# Patient Record
Sex: MALE | Race: WHITE | NOT HISPANIC OR LATINO | Employment: OTHER | URBAN - METROPOLITAN AREA
[De-identification: names, ages, dates, MRNs, and addresses within clinical notes are randomized per-mention and may not be internally consistent; named-entity substitution may affect disease eponyms.]

---

## 2018-09-24 ENCOUNTER — TELEPHONE (OUTPATIENT)
Dept: ORTHOPEDICS | Facility: CLINIC | Age: 73
End: 2018-09-24

## 2018-09-24 NOTE — TELEPHONE ENCOUNTER
Patient calls stating he has an appointment to see Dr. Marks for a knee injury on 9/28/18.   He would like some blood work done ahead of time if possible not related to his knee injury.   He states to have his minerals, etc tested.   Recommended that he discuss with Dr. Marks at his appointment and explained that if blood work was not related to his knee injury, it would be more appropriate for him to have PCP order labs as out of scope of practice for routine labs not related to condition being seen for.   He states his old PCP is no longer practicing and he does not have a new one yet.   He will follow up at his appointment.     WOLFGANG Lazaro RN

## 2018-09-28 ENCOUNTER — RADIANT APPOINTMENT (OUTPATIENT)
Dept: GENERAL RADIOLOGY | Facility: CLINIC | Age: 73
End: 2018-09-28
Attending: FAMILY MEDICINE
Payer: MEDICARE

## 2018-09-28 ENCOUNTER — OFFICE VISIT (OUTPATIENT)
Dept: ORTHOPEDICS | Facility: CLINIC | Age: 73
End: 2018-09-28
Payer: MEDICARE

## 2018-09-28 VITALS
DIASTOLIC BLOOD PRESSURE: 74 MMHG | HEIGHT: 67 IN | BODY MASS INDEX: 25.27 KG/M2 | SYSTOLIC BLOOD PRESSURE: 128 MMHG | WEIGHT: 161 LBS

## 2018-09-28 DIAGNOSIS — M25.562 ACUTE PAIN OF LEFT KNEE: Primary | ICD-10-CM

## 2018-09-28 DIAGNOSIS — M25.562 ACUTE PAIN OF LEFT KNEE: ICD-10-CM

## 2018-09-28 PROCEDURE — 73562 X-RAY EXAM OF KNEE 3: CPT

## 2018-09-28 PROCEDURE — 99203 OFFICE O/P NEW LOW 30 MIN: CPT | Performed by: FAMILY MEDICINE

## 2018-09-28 NOTE — MR AVS SNAPSHOT
"              After Visit Summary   9/28/2018    Isiah Fuentes    MRN: [id number]           Patient Information     Date Of Birth          1945        Visit Information        Provider Department      9/28/2018 1:00 PM Rolo Marks,  [facility]        Today's Diagnoses     Acute pain of left knee    -  1      Care Instructions    1. Acute pain of left knee      Knee is ligamentously stable and no fluid  Pain is related to the tracking of your kneecap  Would encourage regular exercise and at least 2 days of strength training.  Begin this when the knee feels more stable / better    Follow up as needed.      Official Walk with a Doc Chapter  Join me downstairs in the lobby of the [facility] the 1st Saturday of every month at 1pm for a free health talk. Come, listen and walk at your own pace!              Follow-ups after your visit        Who to contact     If you have questions or need follow up information about today's clinic visit or your schedule please contact [facility] directly at [phone].  Normal or non-critical lab and imaging results will be communicated to you by [email or web address], letter or phone within 4 business days after the clinic has received the results. If you do not hear from us within 7 days, please contact the clinic through [facility] or phone. If you have a critical or abnormal lab result, we will notify you by phone as soon as possible.  Submit refill requests through [employer] or call your pharmacy and they will forward the refill request to us. Please allow 3 business days for your refill to be completed.          Additional Information About Your Visit        [email or web address] Information     [employer] lets you send messages to your doctor, view your test results, renew your prescriptions, schedule appointments and more. To sign up, go to [email or web address] . Click on \"Log in\" on the left side of the screen, which will take you to " "the Welcome page. Then click on \"Sign up Now\" on the right side of the page.     You will be asked to enter the access code listed below, as well as some personal information. Please follow the directions to create your username and password.     Your access code is: [email or web address]  Expires: 2018 12:42 PM     Your access code will  in 90 days. If you need help or a new code, please call your [facility] clinic or [phone].        Care EveryWhere ID     This is your Care EveryWhere ID. This could be used by other organizations to access your [facility] medical records  [id number]        Your Vitals Were     Height BMI (Body Mass Index)                5' 6.5\" (1.689 m) 25.6 kg/m2           Blood Pressure from Last 3 Encounters:   18 128/74    Weight from Last 3 Encounters:   18 161 lb (73 kg)               Primary Care Provider Fax #    Physician No Ref-Primary [phone]       No address on file        Equal Access to Services     [facility] : Hadii ana ku hadasho Soomaali, waaxda luqadaha, qaybta kaalmada adeegyada, kelsi hayes . So [facility] [phone].    ATENCIÓN: Si habla español, tiene a bhatti disposición servicios gratuitos de asistencia lingüística. Llame al [phone].    We comply with applicable federal civil rights laws and [place] laws. We do not discriminate on the basis of race, color, national origin, age, disability, sex, sexual orientation, or gender identity.            Thank you!     Thank you for choosing [facility]  for your care. Our goal is always to provide you with excellent care. Hearing back from our patients is one way we can continue to improve our services. Please take a few minutes to complete the written survey that you may receive in the mail after your visit with us. Thank you!             Your Updated Medication List - Protect others around you: Learn how to safely use, store and throw away your medicines at " www.disposemymeds.org.      Notice  As of 9/28/2018  1:43 PM    You have not been prescribed any medications.

## 2018-09-28 NOTE — PROGRESS NOTES
"ASSESSMENT & PLAN    1. Acute pain of left knee      Knee is ligamentously stable and no fluid  Pain is related to the tracking of your kneecap  Would encourage regular exercise and at least 2 days of strength training.  Begin this when the knee feels more stable / better    Follow up as needed.      -----    SUBJECTIVE  Isiah Fuentes is a/an 73 year old male who is seen as a self referral for evaluation of left knee pain. The patient is seen with their wife.    Onset: 1 month(s) ago. Pain came on after doing jumping jacks but denies any acute pain caused by the activity   Location of Pain: left knee pain  Rating of Pain at worst: 8/10  Rating of Pain Currently: 2/10  Worsened by: getting up after prolonged sitting, deep knee flexion  Better with: keeping the knee extended  Treatments tried: glucosamine  Associated symptoms: no distal numbness or tingling; denies swelling or warmth  Orthopedic history: NO  Relevant surgical history: NO  Patient Social History: self employed    Patient's past medical, surgical, social, and family histories were reviewed today and no changes are noted.    REVIEW OF SYSTEMS:  10 point ROS is negative other than symptoms noted above in HPI, Past Medical History or as stated below  Constitutional: NEGATIVE for fever, chills, change in weight  Skin: NEGATIVE for worrisome rashes, moles or lesions  GI/: NEGATIVE for bowel or bladder changes  Neuro: NEGATIVE for weakness, dizziness or paresthesias    OBJECTIVE:  /74  Ht 5' 6.5\" (1.689 m)  Wt 161 lb (73 kg)  BMI 25.6 kg/m2   General: healthy, alert and in no distress  HEENT: no scleral icterus or conjunctival erythema  Skin: no suspicious lesions or rash. No jaundice.  CV: no pedal edema  Resp: normal respiratory effort without conversational dyspnea   Psych: normal mood and affect  Gait: normal steady gait with appropriate coordination and balance  Neuro: Normal light sensory exam of lower extremity  MSK:  LEFT " KNEE  Inspection:    normal alignment, dynamic valgus  Palpation:    Tender about the medial patellar facet and medial joint line. Remainder of bony and ligamentous landmarks are nontender.    No effusion is present    Patellofemoral crepitus is Present  Range of Motion:     00 extension to 1350 flexion  Strength:    Extensor mechanism intact  Special Tests:    Negative: MCL/valgus stress (0 & 30 deg), LCL/varus stress (0 & 30 deg), Lachman's, posterior drawer, Dale's    Independent visualization of the below image:  Recent Results (from the past 24 hour(s))   XR Knee Standing AP Bilat [place] Bilat Lat Left    Narrative    KNEE STANDING AP BILATERAL, SUNRISE BILATERAL, LATERAL LEFT  9/28/2018  1:24 PM     HISTORY:  Acute pain of left knee.      Impression    IMPRESSION:  1. Right knee 2 views: Minimal patellar spurring. Minimal lateral  patellar subluxation.  2. Left knee 3 views: There appears to be a minimal joint effusion.  Otherwise unremarkable.     Patient's conditions were thoroughly discussed during today's visit with greater than 50% of the visit spent counseling the patient with total time spent face-to-face with the patient being 20 minutes.    Rolo Marks DO [facility]

## 2018-09-28 NOTE — LETTER
"    9/28/2018         RE: Isiah Fuentes  [address]  [place] [address]        Dear Colleague,    Thank you for referring your patient, Isiah Fuentes, to the [facility]. Please see a copy of my visit note below.    ASSESSMENT & PLAN    1. Acute pain of left knee      Knee is ligamentously stable and no fluid  Pain is related to the tracking of your kneecap  Would encourage regular exercise and at least 2 days of strength training.  Begin this when the knee feels more stable / better    Follow up as needed.      -----    SUBJECTIVE  Isiah Fuentes is a/an 73 year old male who is seen as a self referral for evaluation of left knee pain. The patient is seen with their wife.    Onset: 1 month(s) ago. Pain came on after doing jumping jacks but denies any acute pain caused by the activity   Location of Pain: left knee pain  Rating of Pain at worst: 8/10  Rating of Pain Currently: 2/10  Worsened by: getting up after prolonged sitting, deep knee flexion  Better with: keeping the knee extended  Treatments tried: glucosamine  Associated symptoms: no distal numbness or tingling; denies swelling or warmth  Orthopedic history: NO  Relevant surgical history: NO  Patient Social History: self employed    Patient's past medical, surgical, social, and family histories were reviewed today and no changes are noted.    REVIEW OF SYSTEMS:  10 point ROS is negative other than symptoms noted above in HPI, Past Medical History or as stated below  Constitutional: NEGATIVE for fever, chills, change in weight  Skin: NEGATIVE for worrisome rashes, moles or lesions  GI/: NEGATIVE for bowel or bladder changes  Neuro: NEGATIVE for weakness, dizziness or paresthesias    OBJECTIVE:  /74  Ht 5' 6.5\" (1.689 m)  Wt 161 lb (73 kg)  BMI 25.6 kg/m2   General: healthy, alert and in no distress  HEENT: no scleral icterus or conjunctival erythema  Skin: no suspicious lesions or rash. No jaundice.  CV: no pedal edema  Resp: " normal respiratory effort without conversational dyspnea   Psych: normal mood and affect  Gait: normal steady gait with appropriate coordination and balance  Neuro: Normal light sensory exam of lower extremity  MSK:  LEFT KNEE  Inspection:    normal alignment, dynamic valgus  Palpation:    Tender about the medial patellar facet and medial joint line. Remainder of bony and ligamentous landmarks are nontender.    No effusion is present    Patellofemoral crepitus is Present  Range of Motion:     00 extension to 1350 flexion  Strength:    Extensor mechanism intact  Special Tests:    Negative: MCL/valgus stress (0 & 30 deg), LCL/varus stress (0 & 30 deg), Lachman's, posterior drawer, Dale's    Independent visualization of the below image:  Recent Results (from the past 24 hour(s))   XR Knee Standing AP Bilat [place] Bilat Lat Left    Narrative    KNEE STANDING AP BILATERAL, SUNRISE BILATERAL, LATERAL LEFT  9/28/2018  1:24 PM     HISTORY:  Acute pain of left knee.      Impression    IMPRESSION:  1. Right knee 2 views: Minimal patellar spurring. Minimal lateral  patellar subluxation.  2. Left knee 3 views: There appears to be a minimal joint effusion.  Otherwise unremarkable.     Patient's conditions were thoroughly discussed during today's visit with greater than 50% of the visit spent counseling the patient with total time spent face-to-face with the patient being 20 minutes.    Rloo Marks DO [facility]      Again, thank you for allowing me to participate in the care of your patient.        Sincerely,        Rolo Marks DO

## 2018-09-28 NOTE — PATIENT INSTRUCTIONS
1. Acute pain of left knee      Knee is ligamentously stable and no fluid  Pain is related to the tracking of your kneecap  Would encourage regular exercise and at least 2 days of strength training.  Begin this when the knee feels more stable / better    Follow up as needed.      Official Walk with a Doc Chapter  Join me downstairs in the lobby of the [facility] the 1st Saturday of every month at 1pm for a free health talk. Come, listen and walk at your own pace!

## 2018-10-31 ENCOUNTER — MYC MEDICAL ADVICE (OUTPATIENT)
Dept: ORTHOPEDICS | Facility: CLINIC | Age: 73
End: 2018-10-31

## 2018-11-01 ENCOUNTER — MYC MEDICAL ADVICE (OUTPATIENT)
Dept: ORTHOPEDICS | Facility: CLINIC | Age: 73
End: 2018-11-01

## 2019-03-15 ENCOUNTER — TRANSFERRED RECORDS (OUTPATIENT)
Dept: HEALTH INFORMATION MANAGEMENT | Facility: CLINIC | Age: 74
End: 2019-03-15

## 2019-07-19 ENCOUNTER — OFFICE VISIT (OUTPATIENT)
Dept: UROLOGY | Facility: CLINIC | Age: 74
End: 2019-07-19
Payer: MEDICARE

## 2019-07-19 VITALS — BODY MASS INDEX: 22.73 KG/M2 | OXYGEN SATURATION: 98 % | WEIGHT: 150 LBS | HEART RATE: 74 BPM | HEIGHT: 68 IN

## 2019-07-19 DIAGNOSIS — R97.20 ELEVATED PROSTATE SPECIFIC ANTIGEN (PSA): Primary | ICD-10-CM

## 2019-07-19 LAB — RESIDUAL VOLUME (RV) (EXTERNAL): 91

## 2019-07-19 PROCEDURE — 51798 US URINE CAPACITY MEASURE: CPT | Performed by: UROLOGY

## 2019-07-19 PROCEDURE — 99204 OFFICE O/P NEW MOD 45 MIN: CPT | Mod: 25 | Performed by: UROLOGY

## 2019-07-19 RX ORDER — MULTIVIT-MIN/IRON/FOLIC ACID/K 18-600-40
CAPSULE ORAL
COMMUNITY

## 2019-07-19 ASSESSMENT — MIFFLIN-ST. JEOR: SCORE: 1396.96

## 2019-07-19 ASSESSMENT — PAIN SCALES - GENERAL: PAINLEVEL: NO PAIN (0)

## 2019-07-19 NOTE — LETTER
7/19/2019       RE: Isiah Fuentes  [address]  [place] [facility] [facility] [facility] [address]     Dear Colleague,    Thank you for referring your patient, Isiah Fuentes, to the [facility] [place] [facility]. Please see a copy of my visit note below.    [facility]  Main Office: [address]  [address]  [place] [address]       CHIEF COMPLAINT:  Elevated PSA    HISTORY:   I was asked by Dr Jesus Guo in [place] to see this 74 year old gentleman who had an elevated PSA in march of 5.8.  Then had it rechecked in June was 10.9. He complains of only minor urinary complaints. He says he has a somewhat slow urinary stream and nocturia ×2. He was prescribed Flomax once but did not take it as he felt his symptoms were not bothersome. He has no history of gross hematuria or infections. No history of prostatitis. No recent change in his urinary complaints. He has no family history of prostate cancer.  He lives in [place], [place] and works as a .      PAST MEDICAL HISTORY:   Past Medical History:   Diagnosis Date     Mumps        PAST SURGICAL HISTORY: History reviewed. No pertinent surgical history.    FAMILY HISTORY: History reviewed. No pertinent family history.    SOCIAL HISTORY:   Social History     Tobacco Use     Smoking status: Never Smoker     Smokeless tobacco: Never Used   Substance Use Topics     Alcohol use: Not on file          Allergies   Allergen Reactions     Peanuts [Nuts]          Current Outpatient Medications:      Ascorbic Acid (VITAMIN C) 500 MG CAPS, , Disp: , Rfl:      Cyanocobalamin (VITAMIN B 12 PO), , Disp: , Rfl:      FOLIC ACID PO, Take 1 mg by mouth daily, Disp: , Rfl:      MULTIPLE VITAMIN PO, Take 1 tablet by mouth daily, Disp: , Rfl:      VITAMIN B COMPLEX-C PO, , Disp: , Rfl:      VITAMIN D, CHOLECALCIFEROL, PO, Take by mouth daily, Disp: , Rfl:     Review Of Systems:  Skin: No rash, pruritis, or skin  "pigmentation  Eyes: No changes in vision  Ears/Nose/Throat: No changes in hearing, no nosebleeds  Respiratory: No shortness of breath, dyspnea on exertion, cough, or hemoptysis  Cardiovascular: No chest pain or palpitations  Gastrointestinal: No diarrhea or constipation. No abdominal pain. No hematochezia  Genitourinary: see HPI  Musculoskeletal: No pain or swelling of joints, normal range of motion  Neurologic: No weakness or tremors  Psychiatric: No recent changes in memory or mood  Hematologic/Lymphatic/Immunologic: No easy bruising or enlarged lymph nodes  Endocrine: No weight gain or loss      PHYSICAL EXAM:    Pulse 74   Ht 1.715 m (5' 7.5\")   Wt 68 kg (150 lb)   SpO2 98%   BMI 23.15 kg/m     General appearance: In NAD, conversant  HEENT: Normocephalic and atraumatic, anicteric sclera  Cardiovascular: Not examined  Respiratory: normal, non-labored breathing  Gastrointestinal: negative, Abdomen soft, non-tender, and non-distended.   Musculoskeletal: Normal musculature and movements  Peripheral Vascular/extremity: No peripheral edema  Skin: Normal temperature, turgor, and texture. No rash  Psychiatric: Appropriate affect, alert and oriented to person, place, and time    Penis: Normal  Scrotal skin: Normal, no lesions  Testicles: Normal to palpation bilaterally  Epididymis: Normal to palpation bilaterally  Lymphatic: Normal inguinal lymph nodes  Digital Rectal Exam: His prostate is moderately enlarged,benign and symmetric to palpation    Cystoscopy: Not done      PSA: 10.9    UA RESULTS:  No results for input(s): COLOR, APPEARANCE, URINEGLC, URINEBILI, URINEKETONE, SG, UBLD, URINEPH, PROTEIN, UROBILINOGEN, NITRITE, LEUKEST, RBCU, WBCU in the last 47628 hours.    Bladder Scan: 91mL    Other Labs:      Imaging Studies: None      CLINICAL IMPRESSION:   Elevated PSA    PLAN:   He has an elevated PSA that has also been somewhat volatile recently. He had a nearly 5 point jump over the course of just 3 months. I " counseled him that he will likely require further evaluation with both an MRI of the prostate and a prostate biopsy.  We did discuss these tests in some detail today.  However, since the PSA has been fairly volatile I did recommend that we recheck the PSAone more time before proceeding with further evaluation.  He agreed to this plan. He will come back to the lab appeared to have a PSA checked next week and then I will contact him with those results.      Jose Fulton MD      Again, thank you for allowing me to participate in the care of your patient.      Sincerely,    Jose Fulton MD

## 2019-07-19 NOTE — NURSING NOTE
pvr by dcanner 91mL.  Pt just voided approx an hour ago.  No ua at this time  Pt has slow flow at nt.  Pt up 2 times a nt to void.  Pt was prescribed flomax... But didn't take it and doesn't want to take any medications.    JOE Nunez CMA

## 2019-07-19 NOTE — PROGRESS NOTES
[facility]  Main Office: [address]  [address]  [place] [address]       CHIEF COMPLAINT:  Elevated PSA    HISTORY:   I was asked by Dr Jesus Guo in [place] to see this 74 year old gentleman who had an elevated PSA in march of 5.8.  Then had it rechecked in June was 10.9. He complains of only minor urinary complaints. He says he has a somewhat slow urinary stream and nocturia ×2. He was prescribed Flomax once but did not take it as he felt his symptoms were not bothersome. He has no history of gross hematuria or infections. No history of prostatitis. No recent change in his urinary complaints. He has no family history of prostate cancer.  He lives in [place], [place] and works as a .      PAST MEDICAL HISTORY:   Past Medical History:   Diagnosis Date     Mumps        PAST SURGICAL HISTORY: History reviewed. No pertinent surgical history.    FAMILY HISTORY: History reviewed. No pertinent family history.    SOCIAL HISTORY:   Social History     Tobacco Use     Smoking status: Never Smoker     Smokeless tobacco: Never Used   Substance Use Topics     Alcohol use: Not on file          Allergies   Allergen Reactions     Peanuts [Nuts]          Current Outpatient Medications:      Ascorbic Acid (VITAMIN C) 500 MG CAPS, , Disp: , Rfl:      Cyanocobalamin (VITAMIN B 12 PO), , Disp: , Rfl:      FOLIC ACID PO, Take 1 mg by mouth daily, Disp: , Rfl:      MULTIPLE VITAMIN PO, Take 1 tablet by mouth daily, Disp: , Rfl:      VITAMIN B COMPLEX-C PO, , Disp: , Rfl:      VITAMIN D, CHOLECALCIFEROL, PO, Take by mouth daily, Disp: , Rfl:     Review Of Systems:  Skin: No rash, pruritis, or skin pigmentation  Eyes: No changes in vision  Ears/Nose/Throat: No changes in hearing, no nosebleeds  Respiratory: No shortness of breath, dyspnea on exertion, cough, or hemoptysis  Cardiovascular: No chest pain or palpitations  Gastrointestinal: No diarrhea or constipation. No abdominal pain. No  "hematochezia  Genitourinary: see HPI  Musculoskeletal: No pain or swelling of joints, normal range of motion  Neurologic: No weakness or tremors  Psychiatric: No recent changes in memory or mood  Hematologic/Lymphatic/Immunologic: No easy bruising or enlarged lymph nodes  Endocrine: No weight gain or loss      PHYSICAL EXAM:    Pulse 74   Ht 1.715 m (5' 7.5\")   Wt 68 kg (150 lb)   SpO2 98%   BMI 23.15 kg/m    General appearance: In NAD, conversant  HEENT: Normocephalic and atraumatic, anicteric sclera  Cardiovascular: Not examined  Respiratory: normal, non-labored breathing  Gastrointestinal: negative, Abdomen soft, non-tender, and non-distended.   Musculoskeletal: Normal musculature and movements  Peripheral Vascular/extremity: No peripheral edema  Skin: Normal temperature, turgor, and texture. No rash  Psychiatric: Appropriate affect, alert and oriented to person, place, and time    Penis: Normal  Scrotal skin: Normal, no lesions  Testicles: Normal to palpation bilaterally  Epididymis: Normal to palpation bilaterally  Lymphatic: Normal inguinal lymph nodes  Digital Rectal Exam: His prostate is moderately enlarged,benign and symmetric to palpation    Cystoscopy: Not done      PSA: 10.9    UA RESULTS:  No results for input(s): COLOR, APPEARANCE, URINEGLC, URINEBILI, URINEKETONE, SG, UBLD, URINEPH, PROTEIN, UROBILINOGEN, NITRITE, LEUKEST, RBCU, WBCU in the last 12474 hours.    Bladder Scan: 91mL    Other Labs:      Imaging Studies: None      CLINICAL IMPRESSION:   Elevated PSA    PLAN:   He has an elevated PSA that has also been somewhat volatile recently. He had a nearly 5 point jump over the course of just 3 months. I counseled him that he will likely require further evaluation with both an MRI of the prostate and a prostate biopsy.  We did discuss these tests in some detail today.  However, since the PSA has been fairly volatile I did recommend that we recheck the PSAone more time before proceeding with " further evaluation.  He agreed to this plan. He will come back to the lab appeared to have a PSA checked next week and then I will contact him with those results.      Jose Fulton MD

## 2019-07-25 DIAGNOSIS — R97.20 ELEVATED PROSTATE SPECIFIC ANTIGEN (PSA): ICD-10-CM

## 2019-07-25 PROCEDURE — 84153 ASSAY OF PSA TOTAL: CPT | Performed by: UROLOGY

## 2019-07-25 PROCEDURE — 36415 COLL VENOUS BLD VENIPUNCTURE: CPT | Performed by: UROLOGY

## 2019-07-26 LAB — PSA SERPL-MCNC: 8.95 UG/L (ref 0–4)

## 2019-07-29 ENCOUNTER — TELEPHONE (OUTPATIENT)
Dept: SURGERY | Facility: CLINIC | Age: 74
End: 2019-07-29

## 2019-07-29 DIAGNOSIS — R97.20 ELEVATED PROSTATE SPECIFIC ANTIGEN (PSA): Primary | ICD-10-CM

## 2019-08-01 ENCOUNTER — HOSPITAL ENCOUNTER (OUTPATIENT)
Dept: MRI IMAGING | Facility: CLINIC | Age: 74
Discharge: HOME | End: 2019-08-01
Attending: UROLOGY | Admitting: UROLOGY
Payer: MEDICARE

## 2019-08-01 DIAGNOSIS — R97.20 ELEVATED PROSTATE SPECIFIC ANTIGEN (PSA): ICD-10-CM

## 2019-08-01 LAB
CREAT BLD-MCNC: 0.8 MG/DL (ref 0.66–1.25)
GFR SERPL CREATININE-BSD FRML MDRD: >90 ML/MIN/{1.73_M2}

## 2019-08-01 PROCEDURE — 72197 MRI PELVIS W/O & W/DYE: CPT

## 2019-08-01 PROCEDURE — 25500064 ZZH RX 255 OP 636: Performed by: UROLOGY

## 2019-08-01 PROCEDURE — 82565 ASSAY OF CREATININE: CPT

## 2019-08-01 PROCEDURE — A9585 GADOBUTROL INJECTION: HCPCS | Performed by: UROLOGY

## 2019-08-01 RX ORDER — GADOBUTROL 604.72 MG/ML
7.5 INJECTION INTRAVENOUS ONCE
Status: COMPLETED | OUTPATIENT
Start: 2019-08-01 | End: 2019-08-01

## 2019-08-01 RX ADMIN — GADOBUTROL 7.5 ML: 604.72 INJECTION INTRAVENOUS at 11:29

## 2019-08-02 ENCOUNTER — TELEPHONE (OUTPATIENT)
Dept: UROLOGY | Facility: CLINIC | Age: 74
End: 2019-08-02

## 2019-08-02 DIAGNOSIS — R97.20 ELEVATED PROSTATE SPECIFIC ANTIGEN (PSA): Primary | ICD-10-CM

## 2019-09-23 DIAGNOSIS — R97.20 ELEVATED PROSTATE SPECIFIC ANTIGEN (PSA): ICD-10-CM

## 2019-09-23 PROCEDURE — 84153 ASSAY OF PSA TOTAL: CPT | Performed by: UROLOGY

## 2019-09-23 PROCEDURE — 36415 COLL VENOUS BLD VENIPUNCTURE: CPT | Performed by: UROLOGY

## 2019-09-24 LAB — PSA SERPL-MCNC: 9.19 UG/L (ref 0–4)

## 2019-09-25 ENCOUNTER — OFFICE VISIT (OUTPATIENT)
Dept: UROLOGY | Facility: CLINIC | Age: 74
End: 2019-09-25
Payer: MEDICARE

## 2019-09-25 VITALS — HEIGHT: 68 IN | OXYGEN SATURATION: 96 % | BODY MASS INDEX: 22.73 KG/M2 | WEIGHT: 150 LBS | HEART RATE: 68 BPM

## 2019-09-25 DIAGNOSIS — R97.20 ELEVATED PROSTATE SPECIFIC ANTIGEN (PSA): Primary | ICD-10-CM

## 2019-09-25 PROCEDURE — 99214 OFFICE O/P EST MOD 30 MIN: CPT | Performed by: UROLOGY

## 2019-09-25 ASSESSMENT — PAIN SCALES - GENERAL: PAINLEVEL: NO PAIN (0)

## 2019-09-25 ASSESSMENT — MIFFLIN-ST. JEOR: SCORE: 1396.96

## 2019-09-25 NOTE — LETTER
Date:September 27, 2019      Patient was self referred, no letter generated. Do not send.        [facility] Health Information

## 2019-09-25 NOTE — PROGRESS NOTES
Office Visit Note  [facility]  [phone]    UROLOGIC DIAGNOSES:   Elevated PSA    CURRENT INTERVENTIONS:   MRI prostate Aug 2019    HISTORY:   Isiah had an MRI of the prostate performed in August and it showed an enlarged prostate measuring 64 g.  There were no areas concerning for prostate cancer.  His PSA was rechecked 2 days ago and it is relatively unchanged at 9.19.  He continues to have no urinary symptoms or complaints.      PAST MEDICAL HISTORY:   Past Medical History:   Diagnosis Date     Mumps        PAST SURGICAL HISTORY: No past surgical history on file.    FAMILY HISTORY: No family history on file.    SOCIAL HISTORY:   Social History     Tobacco Use     Smoking status: Never Smoker     Smokeless tobacco: Never Used   Substance Use Topics     Alcohol use: Not on file       Current Outpatient Medications   Medication     Ascorbic Acid (VITAMIN C) 500 MG CAPS     Cyanocobalamin (VITAMIN B 12 PO)     FOLIC ACID PO     MULTIPLE VITAMIN PO     VITAMIN B COMPLEX-C PO     VITAMIN D, CHOLECALCIFEROL, PO     No current facility-administered medications for this visit.          PHYSICAL EXAM:    There were no vitals taken for this visit.    Constitutional: Well developed. Conversant and in no acute distress  Eyes: Anicteric sclera, conjunctiva clear, normal extraocular movements  ENT: Normocephalic and atraumatic,   Skin: Warm and dry. No rashes or lesions  Cardiac: No peripheral edema  Back/Flank: Not done  CNS/PNS: Normal musculature and movements, moves all extremities normally  Respiratory: Normal non-labored breathing  Abdomen: Soft nontender and nondistended  Peripheral Vascular: No peripheral edema  Mental Status/Psych: Alert and Oriented x 3. Normal mood and affect    Penis: Not done  Scrotal Skin: Not done  Testicles: Not done  Epididymis: Not done  Digital Rectal Exam:     Cystoscopy: Not done    Imaging: None    Urinalysis: UA RESULTS:  No results for input(s): COLOR, APPEARANCE,  URINEGLC, URINEBILI, URINEKETONE, SG, UBLD, URINEPH, PROTEIN, UROBILINOGEN, NITRITE, LEUKEST, RBCU, WBCU in the last 42379 hours.    PSA: 9.19    Post Void Residual:     Other labs: None today      IMPRESSION:  Enlarged prostate, elevated PSA    PLAN:  His PSA remains elevated but relatively stable.  His MRI was negative.  We discussed his options at this time.  We discussed prostate biopsy.  We discussed the risks of this procedure.  We discussed other tests such as 4K test or PCA 3 testing.  We discussed following the PSA more closely.  He had multiple questions about each option.  He understands that the MRI was negative but there is still a possibility that the MRI is missing a significant prostate cancer.  After discussion he decided he would like to come back in 4 months and check the PSA again.  I will get this ordered for him and see him back in January for PSA and exam.    Total Time: 25 min                                      Total in Consultation: 25 min      Jose Fulton M.D.

## 2019-09-25 NOTE — LETTER
9/25/2019       RE: Isiah Fuentes  [address]  [facility] [address]     Dear Colleague,    Thank you for referring your patient, Isiah Fuentes, to the [facility] [place] at [facility]. Please see a copy of my visit note below.    Office Visit Note  [facility]  [phone]    UROLOGIC DIAGNOSES:   Elevated PSA    CURRENT INTERVENTIONS:   MRI prostate Aug 2019    HISTORY:   Isiah had an MRI of the prostate performed in August and it showed an enlarged prostate measuring 64 g.  There were no areas concerning for prostate cancer.  His PSA was rechecked 2 days ago and it is relatively unchanged at 9.19.  He continues to have no urinary symptoms or complaints.      PAST MEDICAL HISTORY:   Past Medical History:   Diagnosis Date     Mumps        PAST SURGICAL HISTORY: No past surgical history on file.    FAMILY HISTORY: No family history on file.    SOCIAL HISTORY:   Social History     Tobacco Use     Smoking status: Never Smoker     Smokeless tobacco: Never Used   Substance Use Topics     Alcohol use: Not on file       Current Outpatient Medications   Medication     Ascorbic Acid (VITAMIN C) 500 MG CAPS     Cyanocobalamin (VITAMIN B 12 PO)     FOLIC ACID PO     MULTIPLE VITAMIN PO     VITAMIN B COMPLEX-C PO     VITAMIN D, CHOLECALCIFEROL, PO     No current facility-administered medications for this visit.          PHYSICAL EXAM:    There were no vitals taken for this visit.    Constitutional: Well developed. Conversant and in no acute distress  Eyes: Anicteric sclera, conjunctiva clear, normal extraocular movements  ENT: Normocephalic and atraumatic,   Skin: Warm and dry. No rashes or lesions  Cardiac: No peripheral edema  Back/Flank: Not done  CNS/PNS: Normal musculature and movements, moves all extremities normally  Respiratory: Normal non-labored breathing  Abdomen: Soft nontender and nondistended  Peripheral Vascular: No peripheral  edema  Mental Status/Psych: Alert and Oriented x 3. Normal mood and affect    Penis: Not done  Scrotal Skin: Not done  Testicles: Not done  Epididymis: Not done  Digital Rectal Exam:     Cystoscopy: Not done    Imaging: None    Urinalysis: UA RESULTS:  No results for input(s): COLOR, APPEARANCE, URINEGLC, URINEBILI, URINEKETONE, SG, UBLD, URINEPH, PROTEIN, UROBILINOGEN, NITRITE, LEUKEST, RBCU, WBCU in the last 06782 hours.    PSA: 9.19    Post Void Residual:     Other labs: None today      IMPRESSION:  Enlarged prostate, elevated PSA    PLAN:  His PSA remains elevated but relatively stable.  His MRI was negative.  We discussed his options at this time.  We discussed prostate biopsy.  We discussed the risks of this procedure.  We discussed other tests such as 4K test or PCA 3 testing.  We discussed following the PSA more closely.  He had multiple questions about each option.  He understands that the MRI was negative but there is still a possibility that the MRI is missing a significant prostate cancer.  After discussion he decided he would like to come back in 4 months and check the PSA again.  I will get this ordered for him and see him back in January for PSA and exam.    Total Time: 25 min                                      Total in Consultation: 25 min      Jose Fulton M.D.              Again, thank you for allowing me to participate in the care of your patient.      Sincerely,    Jose Fulton MD

## 2020-01-27 DIAGNOSIS — R97.20 ELEVATED PROSTATE SPECIFIC ANTIGEN (PSA): ICD-10-CM

## 2020-01-27 PROCEDURE — 99000 SPECIMEN HANDLING OFFICE-LAB: CPT | Performed by: UROLOGY

## 2020-01-27 PROCEDURE — 36415 COLL VENOUS BLD VENIPUNCTURE: CPT | Performed by: UROLOGY

## 2020-01-27 PROCEDURE — 84153 ASSAY OF PSA TOTAL: CPT | Mod: 90 | Performed by: UROLOGY

## 2020-01-27 PROCEDURE — 84154 ASSAY OF PSA FREE: CPT | Mod: 90 | Performed by: UROLOGY

## 2020-01-28 LAB
PSA FREE MFR SERPL: 19 %
PSA FREE SERPL-MCNC: 1.5 NG/ML
PSA SERPL-MCNC: 7.8 NG/ML (ref 0–4)

## 2020-01-29 ENCOUNTER — OFFICE VISIT (OUTPATIENT)
Dept: UROLOGY | Facility: CLINIC | Age: 75
End: 2020-01-29
Payer: MEDICARE

## 2020-01-29 VITALS
BODY MASS INDEX: 24.25 KG/M2 | HEIGHT: 68 IN | WEIGHT: 160 LBS | OXYGEN SATURATION: 97 % | SYSTOLIC BLOOD PRESSURE: 128 MMHG | DIASTOLIC BLOOD PRESSURE: 68 MMHG | HEART RATE: 68 BPM

## 2020-01-29 DIAGNOSIS — R97.20 ELEVATED PROSTATE SPECIFIC ANTIGEN (PSA): Primary | ICD-10-CM

## 2020-01-29 PROCEDURE — 99213 OFFICE O/P EST LOW 20 MIN: CPT | Performed by: UROLOGY

## 2020-01-29 ASSESSMENT — PAIN SCALES - GENERAL: PAINLEVEL: NO PAIN (0)

## 2020-01-29 ASSESSMENT — MIFFLIN-ST. JEOR: SCORE: 1442.32

## 2020-01-29 NOTE — LETTER
Date:February 4, 2020      Patient was self referred, no letter generated. Do not send.        [facility] Health Information

## 2020-01-29 NOTE — NURSING NOTE
Chief Complaint   Patient presents with     Elevated PSA     Patient is here for latest PSA results       Lien Neri, EMT

## 2020-01-29 NOTE — LETTER
1/29/2020       RE: Isiah Fuentes  [address]  [place] [address]     Dear Colleague,    Thank you for referring your patient, Isiah Fuentes, to the [facility] [place] at [facility]. Please see a copy of my visit note below.    Office Visit Note  [facility]  [phone]    UROLOGIC DIAGNOSES:   Elevated PSA    CURRENT INTERVENTIONS:   MRI prostate August 2019    HISTORY:   Isiah returns to clinic today for PSA recheck.  His PSA checked earlier this week was down a small amount at 7.8 with 19% free.  He continues to have no urinary symptoms or complaints.      PAST MEDICAL HISTORY:   Past Medical History:   Diagnosis Date     Mumps        PAST SURGICAL HISTORY: No past surgical history on file.    FAMILY HISTORY: No family history on file.    SOCIAL HISTORY:   Social History     Tobacco Use     Smoking status: Never Smoker     Smokeless tobacco: Never Used   Substance Use Topics     Alcohol use: Not on file       Current Outpatient Medications   Medication     Ascorbic Acid (VITAMIN C) 500 MG CAPS     Cyanocobalamin (VITAMIN B 12 PO)     FOLIC ACID PO     MULTIPLE VITAMIN PO     VITAMIN B COMPLEX-C PO     VITAMIN D, CHOLECALCIFEROL, PO     No current facility-administered medications for this visit.          PHYSICAL EXAM:    There were no vitals taken for this visit.    Constitutional: Well developed. Conversant and in no acute distress  Eyes: Anicteric sclera, conjunctiva clear, normal extraocular movements  ENT: Normocephalic and atraumatic,   Skin: Warm and dry. No rashes or lesions  Cardiac: No peripheral edema  Back/Flank: Not done  CNS/PNS: Normal musculature and movements, moves all extremities normally  Respiratory: Normal non-labored breathing  Abdomen: Soft nontender and nondistended  Peripheral Vascular: No peripheral edema  Mental Status/Psych: Alert and Oriented x 3. Normal mood and affect    Penis: Not  done  Scrotal Skin: Not done  Testicles: Not done  Epididymis: Not done  Digital Rectal Exam: The prostate is moderately enlarged, benign and symmetric to palpation    Cystoscopy: Not done    Imaging: None    Urinalysis: UA RESULTS:  No results for input(s): COLOR, APPEARANCE, URINEGLC, URINEBILI, URINEKETONE, SG, UBLD, URINEPH, PROTEIN, UROBILINOGEN, NITRITE, LEUKEST, RBCU, WBCU in the last 14802 hours.    PSA: 7.8    Post Void Residual:     Other labs: None today      IMPRESSION:  Elevated PSA    PLAN:  His PSA remains elevated but it has improved.  I recommended that we continue to follow this closely.  His examination remains benign.  I will see him back in 6 months for another PSA check.      Jose Fulton M.D.              Again, thank you for allowing me to participate in the care of your patient.      Sincerely,    Jose Fulton MD

## 2020-01-29 NOTE — PROGRESS NOTES
Office Visit Note  [facility]  [phone]    UROLOGIC DIAGNOSES:   Elevated PSA    CURRENT INTERVENTIONS:   MRI prostate August 2019    HISTORY:   Isiah returns to clinic today for PSA recheck.  His PSA checked earlier this week was down a small amount at 7.8 with 19% free.  He continues to have no urinary symptoms or complaints.      PAST MEDICAL HISTORY:   Past Medical History:   Diagnosis Date     Mumps        PAST SURGICAL HISTORY: No past surgical history on file.    FAMILY HISTORY: No family history on file.    SOCIAL HISTORY:   Social History     Tobacco Use     Smoking status: Never Smoker     Smokeless tobacco: Never Used   Substance Use Topics     Alcohol use: Not on file       Current Outpatient Medications   Medication     Ascorbic Acid (VITAMIN C) 500 MG CAPS     Cyanocobalamin (VITAMIN B 12 PO)     FOLIC ACID PO     MULTIPLE VITAMIN PO     VITAMIN B COMPLEX-C PO     VITAMIN D, CHOLECALCIFEROL, PO     No current facility-administered medications for this visit.          PHYSICAL EXAM:    There were no vitals taken for this visit.    Constitutional: Well developed. Conversant and in no acute distress  Eyes: Anicteric sclera, conjunctiva clear, normal extraocular movements  ENT: Normocephalic and atraumatic,   Skin: Warm and dry. No rashes or lesions  Cardiac: No peripheral edema  Back/Flank: Not done  CNS/PNS: Normal musculature and movements, moves all extremities normally  Respiratory: Normal non-labored breathing  Abdomen: Soft nontender and nondistended  Peripheral Vascular: No peripheral edema  Mental Status/Psych: Alert and Oriented x 3. Normal mood and affect    Penis: Not done  Scrotal Skin: Not done  Testicles: Not done  Epididymis: Not done  Digital Rectal Exam: The prostate is moderately enlarged, benign and symmetric to palpation    Cystoscopy: Not done    Imaging: None    Urinalysis: UA RESULTS:  No results for input(s): COLOR, APPEARANCE, URINEGLC, URINEBILI, URINEKETONE,  SG, UBLD, URINEPH, PROTEIN, UROBILINOGEN, NITRITE, LEUKEST, RBCU, WBCU in the last 29449 hours.    PSA: 7.8    Post Void Residual:     Other labs: None today      IMPRESSION:  Elevated PSA    PLAN:  His PSA remains elevated but it has improved.  I recommended that we continue to follow this closely.  His examination remains benign.  I will see him back in 6 months for another PSA check.      Jose Fulton M.D.

## 2020-03-11 ENCOUNTER — HEALTH MAINTENANCE LETTER (OUTPATIENT)
Age: 75
End: 2020-03-11

## 2020-07-20 DIAGNOSIS — R97.20 ELEVATED PROSTATE SPECIFIC ANTIGEN (PSA): ICD-10-CM

## 2020-07-20 PROCEDURE — 84153 ASSAY OF PSA TOTAL: CPT | Performed by: UROLOGY

## 2020-07-20 PROCEDURE — 36415 COLL VENOUS BLD VENIPUNCTURE: CPT | Performed by: UROLOGY

## 2020-07-21 LAB — PSA SERPL-MCNC: 13.4 UG/L (ref 0–4)

## 2020-07-22 ENCOUNTER — VIRTUAL VISIT (OUTPATIENT)
Dept: UROLOGY | Facility: CLINIC | Age: 75
End: 2020-07-22
Payer: MEDICARE

## 2020-07-22 VITALS — BODY MASS INDEX: 22.73 KG/M2 | WEIGHT: 150 LBS | HEIGHT: 68 IN

## 2020-07-22 DIAGNOSIS — R97.20 ELEVATED PROSTATE SPECIFIC ANTIGEN (PSA): Primary | ICD-10-CM

## 2020-07-22 PROCEDURE — 99442 ZZC PHYSICIAN TELEPHONE EVALUATION 11-20 MIN: CPT | Performed by: UROLOGY

## 2020-07-22 RX ORDER — CIPROFLOXACIN 500 MG/1
500 TABLET, FILM COATED ORAL 2 TIMES DAILY
Qty: 6 TABLET | Refills: 0 | Status: SHIPPED | OUTPATIENT
Start: 2020-07-22 | End: 2020-07-25

## 2020-07-22 ASSESSMENT — MIFFLIN-ST. JEOR: SCORE: 1399.9

## 2020-07-22 ASSESSMENT — PAIN SCALES - GENERAL: PAINLEVEL: NO PAIN (0)

## 2020-07-22 NOTE — PROGRESS NOTES
"Isiah Fuentes is a 75 year old male who is being evaluated via a billable telephone visit.      The patient has been notified of following:     \"This telephone visit will be conducted via a call between you and your physician/provider. We have found that certain health care needs can be provided without the need for a physical exam.  This service lets us provide the care you need with a short phone conversation.  If a prescription is necessary we can send it directly to your pharmacy.  If lab work is needed we can place an order for that and you can then stop by our lab to have the test done at a later time.    Telephone visits are billed at different rates depending on your insurance coverage. During this emergency period, for some insurers they may be billed the same as an in-person visit.  Please reach out to your insurance provider with any questions.    If during the course of the call the physician/provider feels a telephone visit is not appropriate, you will not be charged for this service.\"    Patient has given verbal consent for Telephone visit?  Yes    Does not have video devices    What phone number would you like to be contacted at? [phone]    How would you like to obtain your AVS? MyChart    Office Visit Note  [facility]  [phone]    UROLOGIC DIAGNOSES:   Elevated PSA    CURRENT INTERVENTIONS:   Negative MRI prostate 2019    HISTORY:   Isiah had his PSA checked again and it was elevated at 13.4.  This is the highest it has ever been.  He has no urinary symptoms or complaints at this time.      PAST MEDICAL HISTORY:   Past Medical History:   Diagnosis Date     Mumps        PAST SURGICAL HISTORY: History reviewed. No pertinent surgical history.    FAMILY HISTORY: History reviewed. No pertinent family history.    SOCIAL HISTORY:   Social History     Socioeconomic History     Marital status:      Spouse name: None     Number of children: None     Years of education: None     " Highest education level: None   Occupational History     None   Social Needs     Financial resource strain: None     Food insecurity     Worry: None     Inability: None     Transportation needs     Medical: None     Non-medical: None   Tobacco Use     Smoking status: Never Smoker     Smokeless tobacco: Never Used   Substance and Sexual Activity     Alcohol use: None     Drug use: None     Sexual activity: None   Lifestyle     Physical activity     Days per week: None     Minutes per session: None     Stress: None   Relationships     Social connections     Talks on phone: None     Gets together: None     Attends Catholic service: None     Active member of club or organization: None     Attends meetings of clubs or organizations: None     Relationship status: None     Intimate partner violence     Fear of current or ex partner: None     Emotionally abused: None     Physically abused: None     Forced sexual activity: None   Other Topics Concern     Parent/sibling w/ CABG, MI or angioplasty before 65F 55M? Not Asked   Social History Narrative     None       Review Of Systems:  Skin: No rash, pruritis, or skin pigmentation  Eyes: No changes in vision  Ears/Nose/Throat: No changes in hearing, no nosebleeds  Respiratory: No shortness of breath, dyspnea on exertion, cough, or hemoptysis  Cardiovascular: No chest pain or palpitations  Gastrointestinal: No diarrhea or constipation. No abdominal pain. No hematochezia  Genitourinary: see HPI  Musculoskeletal: No pain or swelling of joints, normal range of motion  Neurologic: No weakness or tremors  Psychiatric: No recent changes in memory or mood  Hematologic/Lymphatic/Immunologic: No easy bruising or enlarged lymph nodes  Endocrine: No weight gain or loss      PHYSICAL EXAM:    General: Alert and oriented to time, place, and self. In NAD   Lungs: no respiratory distress or labored breathing  Neuro: Alert, oriented, speech and mentation normal   Psych: affect and mood  normal      Imaging: None    Urinalysis: UA RESULTS:  No results for input(s): COLOR, APPEARANCE, URINEGLC, URINEBILI, URINEKETONE, SG, UBLD, URINEPH, PROTEIN, UROBILINOGEN, NITRITE, LEUKEST, RBCU, WBCU in the last 50712 hours.    PSA: 13.4    Post Void Residual:     Other labs: None today      IMPRESSION:  Elevated PSA    PLAN:  He has an elevated PSA that is risen further.  He had a negative MRI the prostate 11 months ago, but despite the negative MRI I recommended that we proceed with prostate biopsy.  We discussed the risks of false negative with the MRI and he agreed.  We discussed the risks of prostate biopsy including bleeding and infection and he wished to proceed.  Ciprofloxacin was prescribed to his pharmacy.  I will see him back in the near future for prostate biopsy.      Jose Fulton M.D.              Phone call duration: 12 minutes    Jose Fulton MD

## 2020-07-22 NOTE — NURSING NOTE
Chief Complaint   Patient presents with     Elevated PSA     Review latest PSA     Lien Neri, EMT

## 2020-07-22 NOTE — LETTER
Date:July 24, 2020      Patient was self referred, no letter generated. Do not send.        [facility] Health Information

## 2020-07-22 NOTE — LETTER
"7/22/2020       RE: Isiah Fuentes  [address]  [facility] [address]     Dear Colleague,    Thank you for referring your patient, Isiah Fuentes, to the [facility]OLOG[facility] [place] at [facility]. Please see a copy of my visit note below.    Isiah Fuentes is a 75 year old male who is being evaluated via a billable telephone visit.      The patient has been notified of following:     \"This telephone visit will be conducted via a call between you and your physician/provider. We have found that certain health care needs can be provided without the need for a physical exam.  This service lets us provide the care you need with a short phone conversation.  If a prescription is necessary we can send it directly to your pharmacy.  If lab work is needed we can place an order for that and you can then stop by our lab to have the test done at a later time.    Telephone visits are billed at different rates depending on your insurance coverage. During this emergency period, for some insurers they may be billed the same as an in-person visit.  Please reach out to your insurance provider with any questions.    If during the course of the call the physician/provider feels a telephone visit is not appropriate, you will not be charged for this service.\"    Patient has given verbal consent for Telephone visit?  Yes    Does not have video devices    What phone number would you like to be contacted at? [phone]    How would you like to obtain your AVS? MyChart    Office Visit Note  [facility]  [phone]    UROLOGIC DIAGNOSES:   Elevated PSA    CURRENT INTERVENTIONS:   Negative prostate biopsy 2019    HISTORY:   Isiah had his PSA checked again and it was elevated at 13.4.  This is the highest it has ever been.  He has no urinary symptoms or complaints at this time.      PAST MEDICAL HISTORY:   Past Medical History:   Diagnosis Date     Mumps        PAST " SURGICAL HISTORY: History reviewed. No pertinent surgical history.    FAMILY HISTORY: History reviewed. No pertinent family history.    SOCIAL HISTORY:   Social History     Socioeconomic History     Marital status:      Spouse name: None     Number of children: None     Years of education: None     Highest education level: None   Occupational History     None   Social Needs     Financial resource strain: None     Food insecurity     Worry: None     Inability: None     Transportation needs     Medical: None     Non-medical: None   Tobacco Use     Smoking status: Never Smoker     Smokeless tobacco: Never Used   Substance and Sexual Activity     Alcohol use: None     Drug use: None     Sexual activity: None   Lifestyle     Physical activity     Days per week: None     Minutes per session: None     Stress: None   Relationships     Social connections     Talks on phone: None     Gets together: None     Attends Evangelical service: None     Active member of club or organization: None     Attends meetings of clubs or organizations: None     Relationship status: None     Intimate partner violence     Fear of current or ex partner: None     Emotionally abused: None     Physically abused: None     Forced sexual activity: None   Other Topics Concern     Parent/sibling w/ CABG, MI or angioplasty before 65F 55M? Not Asked   Social History Narrative     None       Review Of Systems:  Skin: No rash, pruritis, or skin pigmentation  Eyes: No changes in vision  Ears/Nose/Throat: No changes in hearing, no nosebleeds  Respiratory: No shortness of breath, dyspnea on exertion, cough, or hemoptysis  Cardiovascular: No chest pain or palpitations  Gastrointestinal: No diarrhea or constipation. No abdominal pain. No hematochezia  Genitourinary: see HPI  Musculoskeletal: No pain or swelling of joints, normal range of motion  Neurologic: No weakness or tremors  Psychiatric: No recent changes in memory or  mood  Hematologic/Lymphatic/Immunologic: No easy bruising or enlarged lymph nodes  Endocrine: No weight gain or loss      PHYSICAL EXAM:    General: Alert and oriented to time, place, and self. In NAD   Lungs: no respiratory distress or labored breathing  Neuro: Alert, oriented, speech and mentation normal   Psych: affect and mood normal      Imaging: None    Urinalysis: UA RESULTS:  No results for input(s): COLOR, APPEARANCE, URINEGLC, URINEBILI, URINEKETONE, SG, UBLD, URINEPH, PROTEIN, UROBILINOGEN, NITRITE, LEUKEST, RBCU, WBCU in the last 55450 hours.    PSA: 13.4    Post Void Residual:     Other labs: None today      IMPRESSION:  Elevated PSA    PLAN:  He has an elevated PSA that is risen further.  He had a negative MRI the prostate 11 months ago, but despite the negative MRI I recommended that we proceed with prostate biopsy.  We discussed the risks of false negative with the MRI and he agreed.  We discussed the risks of prostate biopsy including bleeding and infection and he wished to proceed.  Ciprofloxacin was prescribed to his pharmacy.  I will see him back in the near future for prostate biopsy.      Jose Fulton M.D.              Phone call duration: 12 minutes    Jose Fulton MD      Again, thank you for allowing me to participate in the care of your patient.      Sincerely,    Jose Fulton MD

## 2020-08-04 DIAGNOSIS — R97.20 ELEVATED PROSTATE SPECIFIC ANTIGEN (PSA): Primary | ICD-10-CM

## 2020-08-07 DIAGNOSIS — R97.20 ELEVATED PROSTATE SPECIFIC ANTIGEN (PSA): ICD-10-CM

## 2020-08-07 PROCEDURE — 84153 ASSAY OF PSA TOTAL: CPT | Performed by: UROLOGY

## 2020-08-07 PROCEDURE — 36415 COLL VENOUS BLD VENIPUNCTURE: CPT | Performed by: UROLOGY

## 2020-08-08 LAB — PSA SERPL-MCNC: 7.33 UG/L (ref 0–4)

## 2020-08-11 ENCOUNTER — MYC MEDICAL ADVICE (OUTPATIENT)
Dept: UROLOGY | Facility: CLINIC | Age: 75
End: 2020-08-11

## 2020-08-11 DIAGNOSIS — R97.20 ELEVATED PROSTATE SPECIFIC ANTIGEN (PSA): Primary | ICD-10-CM

## 2020-09-09 DIAGNOSIS — R97.20 ELEVATED PROSTATE SPECIFIC ANTIGEN (PSA): ICD-10-CM

## 2020-09-09 PROCEDURE — 84153 ASSAY OF PSA TOTAL: CPT | Performed by: UROLOGY

## 2020-09-09 PROCEDURE — 36415 COLL VENOUS BLD VENIPUNCTURE: CPT | Performed by: UROLOGY

## 2020-09-10 LAB — PSA SERPL-MCNC: 8.19 UG/L (ref 0–4)

## 2020-09-29 ENCOUNTER — VIRTUAL VISIT (OUTPATIENT)
Dept: UROLOGY | Facility: CLINIC | Age: 75
End: 2020-09-29
Payer: MEDICARE

## 2020-09-29 VITALS — HEIGHT: 68 IN | WEIGHT: 150 LBS | BODY MASS INDEX: 22.73 KG/M2

## 2020-09-29 DIAGNOSIS — N40.0 ENLARGED PROSTATE: Primary | ICD-10-CM

## 2020-09-29 DIAGNOSIS — R97.20 ELEVATED PROSTATE SPECIFIC ANTIGEN (PSA): ICD-10-CM

## 2020-09-29 PROCEDURE — 99442 ZZC PHYSICIAN TELEPHONE EVALUATION 11-20 MIN: CPT | Performed by: UROLOGY

## 2020-09-29 ASSESSMENT — MIFFLIN-ST. JEOR: SCORE: 1399.9

## 2020-09-29 ASSESSMENT — PAIN SCALES - GENERAL: PAINLEVEL: NO PAIN (0)

## 2020-09-29 NOTE — NURSING NOTE
Chief Complaint   Patient presents with     Clinic Care Coordination - Follow-up     PSA   Patient states he gets up x2 during the night to void.  Johana Su LPN

## 2020-09-29 NOTE — LETTER
"9/29/2020       RE: Isiah Fuentes  [address]  [facility] [address]     Dear Colleague,    Thank you for referring your patient, Isaih Fuentes, to the [facility] [place] at [facility]. Please see a copy of my visit note below.    Isiah Fuentes is a 75 year old male who is being evaluated via a billable telephone visit.      The patient has been notified of following:     \"This telephone visit will be conducted via a call between you and your physician/provider. We have found that certain health care needs can be provided without the need for a physical exam.  This service lets us provide the care you need with a short phone conversation.  If a prescription is necessary we can send it directly to your pharmacy.  If lab work is needed we can place an order for that and you can then stop by our lab to have the test done at a later time.    Telephone visits are billed at different rates depending on your insurance coverage. During this emergency period, for some insurers they may be billed the same as an in-person visit.  Please reach out to your insurance provider with any questions.    If during the course of the call the physician/provider feels a telephone visit is not appropriate, you will not be charged for this service.\"    Patient has given verbal consent for Telephone visit?  Yes    What phone number would you like to be contacted at? [phone]    How would you like to obtain your AVS? MyChart    Office Visit Note  [facility]  [phone]    UROLOGIC DIAGNOSES:   Elevated PSA    CURRENT INTERVENTIONS:   MRI prostate 2019    HISTORY:   Isiah is set up for phone visit for follow up on his PSA. PSA is 8.19. He has no urinary symptoms      PAST MEDICAL HISTORY:   Past Medical History:   Diagnosis Date     Mumps        PAST SURGICAL HISTORY: History reviewed. No pertinent surgical history.    FAMILY HISTORY: History reviewed. No " pertinent family history.    SOCIAL HISTORY:   Social History     Socioeconomic History     Marital status:      Spouse name: None     Number of children: None     Years of education: None     Highest education level: None   Occupational History     None   Social Needs     Financial resource strain: None     Food insecurity     Worry: None     Inability: None     Transportation needs     Medical: None     Non-medical: None   Tobacco Use     Smoking status: Never Smoker     Smokeless tobacco: Never Used   Substance and Sexual Activity     Alcohol use: Yes     Drug use: Never     Sexual activity: Not Currently   Lifestyle     Physical activity     Days per week: None     Minutes per session: None     Stress: None   Relationships     Social connections     Talks on phone: None     Gets together: None     Attends Alevism service: None     Active member of club or organization: None     Attends meetings of clubs or organizations: None     Relationship status: None     Intimate partner violence     Fear of current or ex partner: None     Emotionally abused: None     Physically abused: None     Forced sexual activity: None   Other Topics Concern     Parent/sibling w/ CABG, MI or angioplasty before 65F 55M? Not Asked   Social History Narrative     None       Review Of Systems:  Skin: No rash, pruritis, or skin pigmentation  Eyes: No changes in vision  Ears/Nose/Throat: No changes in hearing, no nosebleeds  Respiratory: No shortness of breath, dyspnea on exertion, cough, or hemoptysis  Cardiovascular: No chest pain or palpitations  Gastrointestinal: No diarrhea or constipation. No abdominal pain. No hematochezia  Genitourinary: see HPI  Musculoskeletal: No pain or swelling of joints, normal range of motion  Neurologic: No weakness or tremors  Psychiatric: No recent changes in memory or mood  Hematologic/Lymphatic/Immunologic: No easy bruising or enlarged lymph nodes  Endocrine: No weight gain or loss      PHYSICAL  EXAM:    General: Alert and oriented to time, place, and self. In NAD   Lungs: no respiratory distress or labored breathing  Neuro: Alert, oriented, speech and mentation normal   Psych: affect and mood normal      Imaging: None    Urinalysis: UA RESULTS:  No results for input(s): COLOR, APPEARANCE, URINEGLC, URINEBILI, URINEKETONE, SG, UBLD, URINEPH, PROTEIN, UROBILINOGEN, NITRITE, LEUKEST, RBCU, WBCU in the last 27757 hours.    PSA: 8.19    Post Void Residual:     Other labs: None today      IMPRESSION:  Elevated PSA, enlarged prostate    PLAN:  His PSA has jumped around quite a bit but remains in line with what it has been over the past 1.5 years.  We discussed his PSA history.  The PSA has been high for 2 years now.  I recommended a prostate biopsy.  We discussed the risks of a false negative with his MRI of the prostate.  We discussed the risks of prostate biopsy as well including bleeding and infection.  He had questions about prostate biopsy that were answered for him.  He would like to think things over a bit.  He reported that he will get back to our clinic on his decision.  If he decides to forego prostate biopsy at this time I recommended that he recheck the PSA again in 6 months and that we repeat his exam at that time as well.      Jose Fulton M.D.              Phone call duration: 12 minutes    Jose Fulton MD      Again, thank you for allowing me to participate in the care of your patient.      Sincerely,    Jose Fulton MD

## 2020-09-29 NOTE — PROGRESS NOTES
"Isiah Fuentes is a 75 year old male who is being evaluated via a billable telephone visit.      The patient has been notified of following:     \"This telephone visit will be conducted via a call between you and your physician/provider. We have found that certain health care needs can be provided without the need for a physical exam.  This service lets us provide the care you need with a short phone conversation.  If a prescription is necessary we can send it directly to your pharmacy.  If lab work is needed we can place an order for that and you can then stop by our lab to have the test done at a later time.    Telephone visits are billed at different rates depending on your insurance coverage. During this emergency period, for some insurers they may be billed the same as an in-person visit.  Please reach out to your insurance provider with any questions.    If during the course of the call the physician/provider feels a telephone visit is not appropriate, you will not be charged for this service.\"    Patient has given verbal consent for Telephone visit?  Yes    What phone number would you like to be contacted at? [phone]    How would you like to obtain your AVS? MyChart    Office Visit Note  [facility]  [phone]    UROLOGIC DIAGNOSES:   Elevated PSA    CURRENT INTERVENTIONS:   MRI prostate 2019    HISTORY:   Isiah is set up for phone visit for follow up on his PSA. PSA is 8.19. He has no urinary symptoms      PAST MEDICAL HISTORY:   Past Medical History:   Diagnosis Date     Mumps        PAST SURGICAL HISTORY: History reviewed. No pertinent surgical history.    FAMILY HISTORY: History reviewed. No pertinent family history.    SOCIAL HISTORY:   Social History     Socioeconomic History     Marital status:      Spouse name: None     Number of children: None     Years of education: None     Highest education level: None   Occupational History     None   Social Needs     Financial resource " strain: None     Food insecurity     Worry: None     Inability: None     Transportation needs     Medical: None     Non-medical: None   Tobacco Use     Smoking status: Never Smoker     Smokeless tobacco: Never Used   Substance and Sexual Activity     Alcohol use: Yes     Drug use: Never     Sexual activity: Not Currently   Lifestyle     Physical activity     Days per week: None     Minutes per session: None     Stress: None   Relationships     Social connections     Talks on phone: None     Gets together: None     Attends Temple service: None     Active member of club or organization: None     Attends meetings of clubs or organizations: None     Relationship status: None     Intimate partner violence     Fear of current or ex partner: None     Emotionally abused: None     Physically abused: None     Forced sexual activity: None   Other Topics Concern     Parent/sibling w/ CABG, MI or angioplasty before 65F 55M? Not Asked   Social History Narrative     None       Review Of Systems:  Skin: No rash, pruritis, or skin pigmentation  Eyes: No changes in vision  Ears/Nose/Throat: No changes in hearing, no nosebleeds  Respiratory: No shortness of breath, dyspnea on exertion, cough, or hemoptysis  Cardiovascular: No chest pain or palpitations  Gastrointestinal: No diarrhea or constipation. No abdominal pain. No hematochezia  Genitourinary: see HPI  Musculoskeletal: No pain or swelling of joints, normal range of motion  Neurologic: No weakness or tremors  Psychiatric: No recent changes in memory or mood  Hematologic/Lymphatic/Immunologic: No easy bruising or enlarged lymph nodes  Endocrine: No weight gain or loss      PHYSICAL EXAM:    General: Alert and oriented to time, place, and self. In NAD   Lungs: no respiratory distress or labored breathing  Neuro: Alert, oriented, speech and mentation normal   Psych: affect and mood normal      Imaging: None    Urinalysis: UA RESULTS:  No results for input(s): COLOR, APPEARANCE,  URINEGLC, URINEBILI, URINEKETONE, SG, UBLD, URINEPH, PROTEIN, UROBILINOGEN, NITRITE, LEUKEST, RBCU, WBCU in the last 63765 hours.    PSA: 8.19    Post Void Residual:     Other labs: None today      IMPRESSION:  Elevated PSA, enlarged prostate    PLAN:  His PSA has jumped around quite a bit but remains in line with what it has been over the past 1.5 years.  We discussed his PSA history.  The PSA has been high for 2 years now.  I recommended a prostate biopsy.  We discussed the risks of a false negative with his MRI of the prostate.  We discussed the risks of prostate biopsy as well including bleeding and infection.  He had questions about prostate biopsy that were answered for him.  He would like to think things over a bit.  He reported that he will get back to our clinic on his decision.  If he decides to forego prostate biopsy at this time I recommended that he recheck the PSA again in 6 months and that we repeat his exam at that time as well.      Jose Fulton M.D.              Phone call duration: 12 minutes    Jose Fulton MD

## 2020-09-29 NOTE — LETTER
Date:September 30, 2020      Provider requested that no letter be sent. Do not send.       [facility] Health Information

## 2020-12-15 ENCOUNTER — OFFICE VISIT (OUTPATIENT)
Dept: UROLOGY | Facility: CLINIC | Age: 75
End: 2020-12-15
Payer: MEDICARE

## 2020-12-15 VITALS
OXYGEN SATURATION: 97 % | BODY MASS INDEX: 24.25 KG/M2 | WEIGHT: 160 LBS | HEIGHT: 68 IN | DIASTOLIC BLOOD PRESSURE: 64 MMHG | HEART RATE: 80 BPM | SYSTOLIC BLOOD PRESSURE: 110 MMHG

## 2020-12-15 DIAGNOSIS — R97.20 ELEVATED PROSTATE SPECIFIC ANTIGEN (PSA): Primary | ICD-10-CM

## 2020-12-15 PROCEDURE — 76872 US TRANSRECTAL: CPT | Performed by: UROLOGY

## 2020-12-15 PROCEDURE — 55700 PR BIOPSY OF PROSTATE,NEEDLE/PUNCH: CPT | Performed by: UROLOGY

## 2020-12-15 PROCEDURE — 96372 THER/PROPH/DIAG INJ SC/IM: CPT | Mod: 59 | Performed by: UROLOGY

## 2020-12-15 PROCEDURE — 88305 TISSUE EXAM BY PATHOLOGIST: CPT | Performed by: PATHOLOGY

## 2020-12-15 RX ORDER — LIDOCAINE HYDROCHLORIDE 20 MG/ML
15 INJECTION, SOLUTION INFILTRATION; PERINEURAL ONCE
Status: COMPLETED | OUTPATIENT
Start: 2020-12-15 | End: 2020-12-15

## 2020-12-15 RX ADMIN — LIDOCAINE HYDROCHLORIDE 15 ML: 20 INJECTION, SOLUTION INFILTRATION; PERINEURAL at 16:00

## 2020-12-15 ASSESSMENT — MIFFLIN-ST. JEOR: SCORE: 1437.32

## 2020-12-15 ASSESSMENT — PAIN SCALES - GENERAL: PAINLEVEL: NO PAIN (0)

## 2020-12-15 NOTE — PROGRESS NOTES
Isiah Fuentes is here for a transrectal altrasound guided needle biopsy of the prostate for a significant risk of potentially lethal prostate cancer.    The risks, benefits, of the procudure were discussed.  All questions were answered.  A written informed consent was obtained.      PSA   Date Value Ref Range Status   09/09/2020 8.19 (H) 0 - 4 ug/L Final     Comment:     Assay Method:  Chemiluminescence using Siemens Vista analyzer   08/07/2020 7.33 (H) 0 - 4 ug/L Final     Comment:     Assay Method:  Chemiluminescence using Siemens Vista analyzer   07/20/2020 13.40 (H) 0 - 4 ug/L Final     Comment:     Assay Method:  Chemiluminescence using Siemens Vista analyzer   09/23/2019 9.19 (H) 0 - 4 ug/L Final     Comment:     Assay Method:  Chemiluminescence using Siemens Vista analyzer       An enema was completed and 500 mg of Cipro twice daily was started prior to the biopsy.  After obtaining informed consent patient was placed in lateral decubitus position.  The ultrasound probe was placed in the rectum.  The prostate was numbed using ultrasound guidance with 1% lidocaine 5 mls along each nerve bundle.      US images were used to guide the biopsies of the prostate.  12 cores were taken with 6 on each side, 2 at the base,  2 at the midgland and  2 at the apex.  The patient tolerated the procedure well.      We will follow up with the results in 7-10 days and contact patient with these results.

## 2020-12-15 NOTE — NURSING NOTE
The following medication was given:     MEDICATION:  Lidocaine 2% Soln  ROUTE: Local Infiltration   SITE: Prostate  DOSE: 300mg/15ml  LOT #: [id number]  : Hospira  EXPIRATION DATE: 10/01/2021  NDC#: 8333-3309-41   Was there drug waste? Yes  Amount of drug waste (mL): 5.  Reason for waste:  As per MD  Multi-dose vial: Yes    Niki Rosa LPN  December 15, 2020

## 2020-12-15 NOTE — LETTER
12/15/2020       RE: Isiah Fuentes  [address]  [facility] [address]     Dear Colleague,    Thank you for referring your patient, Isiah Fuentes, to the [facility] [place] at [facility]. Please see a copy of my visit note below.    Isiah Fuentes is here for a transrectal altrasound guided needle biopsy of the prostate for a significant risk of potentially lethal prostate cancer.    The risks, benefits, of the procudure were discussed.  All questions were answered.  A written informed consent was obtained.      PSA   Date Value Ref Range Status   09/09/2020 8.19 (H) 0 - 4 ug/L Final     Comment:     Assay Method:  Chemiluminescence using Siemens Vista analyzer   08/07/2020 7.33 (H) 0 - 4 ug/L Final     Comment:     Assay Method:  Chemiluminescence using Siemens Vista analyzer   07/20/2020 13.40 (H) 0 - 4 ug/L Final     Comment:     Assay Method:  Chemiluminescence using Siemens Vista analyzer   09/23/2019 9.19 (H) 0 - 4 ug/L Final     Comment:     Assay Method:  Chemiluminescence using Siemens Vista analyzer       An enema was completed and 500 mg of Cipro twice daily was started prior to the biopsy.  After obtaining informed consent patient was placed in lateral decubitus position.  The ultrasound probe was placed in the rectum.  The prostate was numbed using ultrasound guidance with 1% lidocaine 5 mls along each nerve bundle.      US images were used to guide the biopsies of the prostate.  12 cores were taken with 6 on each side, 2 at the base,  2 at the midgland and  2 at the apex.  The patient tolerated the procedure well.      We will follow up with the results in 7-10 days and contact patient with these results.          Again, thank you for allowing me to participate in the care of your patient.      Sincerely,    Jose Fulton MD

## 2020-12-15 NOTE — PATIENT INSTRUCTIONS
[facility]  Transrectal Ultrasound  Post Operative Information    The physician who performed your Transrectal Ultrasound is Dr. Fulton (telephone number [phone]).  Please contact this doctor if you have any problems or questions.  If unable to reach your doctor, please return to the Emergency Department.      Take one antibiotic the evening of the procedure and then as directed on your prescription.    Drink at least 6-8 glasses of fluids for the first 48 hours.    Avoid heavy lifting and strenuous activity for 48 hours.    Avoid sexual intercourse for the first 24 hours.    No aspirin or ibuprofen products (Motrin, Advil, Nuprin, ect.) for one week.  You may take acetaminophen (Tylenol) for pain.    You may notice a small amount of blood on the tissue after a bowel movement.    You may pass blood with clots in your urine following the procedure.  The amount will decrease with time but may be visible for up to two weeks.     You make have blood in your semen for 4 weeks after the procedure.    You may experience mild perineal (groin area) discomfort after the procedure.    Please call you doctor if you have any of the follow symptoms:  Fever  Increase in the amount of blood passed  Severe discomfort or pain

## 2020-12-15 NOTE — LETTER
Date:December 16, 2020      Patient was self referred, no letter generated. Do not send.        [facility] Health Information

## 2020-12-15 NOTE — NURSING NOTE
Chief Complaint   Patient presents with     Elevated PSA     Patient is here for prostate biopsies      Prior to the start of the procedure and with procedural staff participation, I verbally confirmed the patient s identity using two indicators, relevant allergies, that the procedure was appropriate and matched the consent or emergent situation, and that the correct equipment/implants were available. Immediately prior to starting the procedure I conducted the Time Out with the procedural staff and re-confirmed the patient s name, procedure, and site/side. (The Joint Commission universal protocol was followed.)  Yes    Sedation (Moderate or Deep): None  Consent read and signed: Yes     Allergies   Allergen Reactions     Morphine Sulfate Nausea and Vomiting and Cramps     Pre-operative antibiotics taken: Yes  Aspirin or other blood thinning medications not taken in 7-10 days:  Yes  Time of Fleet's enema: 2:30pm  Niki Rosa LPN

## 2020-12-17 LAB — COPATH REPORT: NORMAL

## 2020-12-22 ENCOUNTER — VIRTUAL VISIT (OUTPATIENT)
Dept: UROLOGY | Facility: CLINIC | Age: 75
End: 2020-12-22
Payer: MEDICARE

## 2020-12-22 VITALS — HEIGHT: 68 IN | WEIGHT: 160 LBS | BODY MASS INDEX: 24.25 KG/M2

## 2020-12-22 DIAGNOSIS — N40.0 ENLARGED PROSTATE: ICD-10-CM

## 2020-12-22 DIAGNOSIS — R97.20 ELEVATED PROSTATE SPECIFIC ANTIGEN (PSA): Primary | ICD-10-CM

## 2020-12-22 PROCEDURE — 99441 PR PHYSICIAN TELEPHONE EVALUATION 5-10 MIN: CPT | Performed by: UROLOGY

## 2020-12-22 ASSESSMENT — MIFFLIN-ST. JEOR: SCORE: 1445.26

## 2020-12-22 ASSESSMENT — PAIN SCALES - GENERAL: PAINLEVEL: NO PAIN (0)

## 2020-12-22 NOTE — LETTER
"12/22/2020       RE: Isiah Fuentes  [address]  [facility] [address]     Dear Colleague,    Thank you for referring your patient, Isiah Fuentes, to the [facility] [place] at [facility]. Please see a copy of my visit note below.    Isiah Fuentes is a 75 year old male who is being evaluated via a billable telephone visit.      The patient has been notified of following:     \"This telephone visit will be conducted via a call between you and your physician/provider. We have found that certain health care needs can be provided without the need for a physical exam.  This service lets us provide the care you need with a short phone conversation.  If a prescription is necessary we can send it directly to your pharmacy.  If lab work is needed we can place an order for that and you can then stop by our lab to have the test done at a later time.    Telephone visits are billed at different rates depending on your insurance coverage. During this emergency period, for some insurers they may be billed the same as an in-person visit.  Please reach out to your insurance provider with any questions.    If during the course of the call the physician/provider feels a telephone visit is not appropriate, you will not be charged for this service.\"    Patient has given verbal consent for Telephone visit?  Yes    What phone number would you like to be contacted at? [phone]    How would you like to obtain your AVS? MyChart    Office Visit Note  [facility]  [phone]    UROLOGIC DIAGNOSES:   Enlarged prostate, elevated PSA    CURRENT INTERVENTIONS:   Negative MRI prostate with a negative prostate biopsy    HISTORY:   Isiah underwent template prostate biopsies and all biopsies came back negative.  No evidence of prostate cancer.  He has felt well since his biopsy.      PAST MEDICAL HISTORY:   Past Medical History:   Diagnosis Date     Mumps        PAST SURGICAL " HISTORY: History reviewed. No pertinent surgical history.    FAMILY HISTORY: History reviewed. No pertinent family history.    SOCIAL HISTORY:   Social History     Socioeconomic History     Marital status:      Spouse name: None     Number of children: None     Years of education: None     Highest education level: None   Occupational History     None   Social Needs     Financial resource strain: None     Food insecurity     Worry: None     Inability: None     Transportation needs     Medical: None     Non-medical: None   Tobacco Use     Smoking status: Never Smoker     Smokeless tobacco: Never Used   Substance and Sexual Activity     Alcohol use: Yes     Drug use: Never     Sexual activity: Not Currently   Lifestyle     Physical activity     Days per week: None     Minutes per session: None     Stress: None   Relationships     Social connections     Talks on phone: None     Gets together: None     Attends Quaker service: None     Active member of club or organization: None     Attends meetings of clubs or organizations: None     Relationship status: None     Intimate partner violence     Fear of current or ex partner: None     Emotionally abused: None     Physically abused: None     Forced sexual activity: None   Other Topics Concern     Parent/sibling w/ CABG, MI or angioplasty before 65F 55M? Not Asked   Social History Narrative     None       Review Of Systems:  Skin: No rash, pruritis, or skin pigmentation  Eyes: No changes in vision  Ears/Nose/Throat: No changes in hearing, no nosebleeds  Respiratory: No shortness of breath, dyspnea on exertion, cough, or hemoptysis  Cardiovascular: No chest pain or palpitations  Gastrointestinal: No diarrhea or constipation. No abdominal pain. No hematochezia  Genitourinary: see HPI  Musculoskeletal: No pain or swelling of joints, normal range of motion  Neurologic: No weakness or tremors  Psychiatric: No recent changes in memory or  mood  Hematologic/Lymphatic/Immunologic: No easy bruising or enlarged lymph nodes  Endocrine: No weight gain or loss      PHYSICAL EXAM:    General: Alert and oriented to time, place, and self. In NAD   Lungs: no respiratory distress or labored breathing  Neuro: Alert, oriented, speech and mentation normal   Psych: affect and mood normal      Imaging: None    Urinalysis: UA RESULTS:  No results for input(s): COLOR, APPEARANCE, URINEGLC, URINEBILI, URINEKETONE, SG, UBLD, URINEPH, PROTEIN, UROBILINOGEN, NITRITE, LEUKEST, RBCU, WBCU in the last 19038 hours.    PSA: 8.19    Post Void Residual:     Other labs: None today      IMPRESSION:  Enlarged prostate, elevated PSA    PLAN:  We discussed the results of the biopsy and plan from there.  He has now had a normal MRI and normal prostate biopsy.  He can wait until it has been 1 year since his last PSA and the next PSA check.  I will see him back for this in September 2021.      Jose Fulton M.D.              Phone call duration:  6 minutes                  Again, thank you for allowing me to participate in the care of your patient.      Sincerely,    Jose Fulton MD

## 2020-12-22 NOTE — PROGRESS NOTES
"Isiah Fuentes is a 75 year old male who is being evaluated via a billable telephone visit.      The patient has been notified of following:     \"This telephone visit will be conducted via a call between you and your physician/provider. We have found that certain health care needs can be provided without the need for a physical exam.  This service lets us provide the care you need with a short phone conversation.  If a prescription is necessary we can send it directly to your pharmacy.  If lab work is needed we can place an order for that and you can then stop by our lab to have the test done at a later time.    Telephone visits are billed at different rates depending on your insurance coverage. During this emergency period, for some insurers they may be billed the same as an in-person visit.  Please reach out to your insurance provider with any questions.    If during the course of the call the physician/provider feels a telephone visit is not appropriate, you will not be charged for this service.\"    Patient has given verbal consent for Telephone visit?  Yes    What phone number would you like to be contacted at? [phone]    How would you like to obtain your AVS? MyChart    Office Visit Note  [facility]  [phone]    UROLOGIC DIAGNOSES:   Enlarged prostate, elevated PSA    CURRENT INTERVENTIONS:   Negative MRI prostate with a negative prostate biopsy    HISTORY:   Isiah underwent template prostate biopsies and all biopsies came back negative.  No evidence of prostate cancer.  He has felt well since his biopsy.      PAST MEDICAL HISTORY:   Past Medical History:   Diagnosis Date     Mumps        PAST SURGICAL HISTORY: History reviewed. No pertinent surgical history.    FAMILY HISTORY: History reviewed. No pertinent family history.    SOCIAL HISTORY:   Social History     Socioeconomic History     Marital status:      Spouse name: None     Number of children: None     Years of education: " None     Highest education level: None   Occupational History     None   Social Needs     Financial resource strain: None     Food insecurity     Worry: None     Inability: None     Transportation needs     Medical: None     Non-medical: None   Tobacco Use     Smoking status: Never Smoker     Smokeless tobacco: Never Used   Substance and Sexual Activity     Alcohol use: Yes     Drug use: Never     Sexual activity: Not Currently   Lifestyle     Physical activity     Days per week: None     Minutes per session: None     Stress: None   Relationships     Social connections     Talks on phone: None     Gets together: None     Attends Yazidism service: None     Active member of club or organization: None     Attends meetings of clubs or organizations: None     Relationship status: None     Intimate partner violence     Fear of current or ex partner: None     Emotionally abused: None     Physically abused: None     Forced sexual activity: None   Other Topics Concern     Parent/sibling w/ CABG, MI or angioplasty before 65F 55M? Not Asked   Social History Narrative     None       Review Of Systems:  Skin: No rash, pruritis, or skin pigmentation  Eyes: No changes in vision  Ears/Nose/Throat: No changes in hearing, no nosebleeds  Respiratory: No shortness of breath, dyspnea on exertion, cough, or hemoptysis  Cardiovascular: No chest pain or palpitations  Gastrointestinal: No diarrhea or constipation. No abdominal pain. No hematochezia  Genitourinary: see HPI  Musculoskeletal: No pain or swelling of joints, normal range of motion  Neurologic: No weakness or tremors  Psychiatric: No recent changes in memory or mood  Hematologic/Lymphatic/Immunologic: No easy bruising or enlarged lymph nodes  Endocrine: No weight gain or loss      PHYSICAL EXAM:    General: Alert and oriented to time, place, and self. In NAD   Lungs: no respiratory distress or labored breathing  Neuro: Alert, oriented, speech and mentation normal   Psych: affect  and mood normal      Imaging: None    Urinalysis: UA RESULTS:  No results for input(s): COLOR, APPEARANCE, URINEGLC, URINEBILI, URINEKETONE, SG, UBLD, URINEPH, PROTEIN, UROBILINOGEN, NITRITE, LEUKEST, RBCU, WBCU in the last 84977 hours.    PSA: 8.19    Post Void Residual:     Other labs: None today      IMPRESSION:  Enlarged prostate, elevated PSA    PLAN:  We discussed the results of the biopsy and plan from there.  He has now had a normal MRI and normal prostate biopsy.  He can wait until it has been 1 year since his last PSA and the next PSA check.  I will see him back for this in September 2021.      Jose Fulton M.D.              Phone call duration:  6 minutes

## 2020-12-22 NOTE — LETTER
Date:December 26, 2020      Patient was self referred, no letter generated. Do not send.        [facility] Health Information

## 2021-01-03 ENCOUNTER — HEALTH MAINTENANCE LETTER (OUTPATIENT)
Age: 76
End: 2021-01-03

## 2021-04-25 ENCOUNTER — HEALTH MAINTENANCE LETTER (OUTPATIENT)
Age: 76
End: 2021-04-25

## 2021-08-19 DIAGNOSIS — R97.20 ELEVATED PROSTATE SPECIFIC ANTIGEN (PSA): Primary | ICD-10-CM

## 2021-09-27 ENCOUNTER — LAB (OUTPATIENT)
Dept: LAB | Facility: CLINIC | Age: 76
End: 2021-09-27
Payer: MEDICARE

## 2021-09-27 DIAGNOSIS — R97.20 ELEVATED PROSTATE SPECIFIC ANTIGEN (PSA): ICD-10-CM

## 2021-09-27 PROCEDURE — 84153 ASSAY OF PSA TOTAL: CPT

## 2021-09-27 PROCEDURE — 36415 COLL VENOUS BLD VENIPUNCTURE: CPT

## 2021-09-28 LAB — PSA SERPL-MCNC: 12.6 UG/L (ref 0–4)

## 2021-09-29 ENCOUNTER — OFFICE VISIT (OUTPATIENT)
Dept: UROLOGY | Facility: CLINIC | Age: 76
End: 2021-09-29
Payer: MEDICARE

## 2021-09-29 VITALS
WEIGHT: 160 LBS | SYSTOLIC BLOOD PRESSURE: 122 MMHG | HEIGHT: 68 IN | BODY MASS INDEX: 24.25 KG/M2 | DIASTOLIC BLOOD PRESSURE: 60 MMHG

## 2021-09-29 DIAGNOSIS — N40.0 ENLARGED PROSTATE: Primary | ICD-10-CM

## 2021-09-29 DIAGNOSIS — R97.20 ELEVATED PROSTATE SPECIFIC ANTIGEN (PSA): ICD-10-CM

## 2021-09-29 LAB
ALBUMIN UR-MCNC: NEGATIVE MG/DL
APPEARANCE UR: CLEAR
BILIRUB UR QL STRIP: NEGATIVE
COLOR UR AUTO: YELLOW
GLUCOSE UR STRIP-MCNC: NEGATIVE MG/DL
HGB UR QL STRIP: NEGATIVE
KETONES UR STRIP-MCNC: NEGATIVE MG/DL
LEUKOCYTE ESTERASE UR QL STRIP: NEGATIVE
NITRATE UR QL: NEGATIVE
PH UR STRIP: 5.5 [PH] (ref 5–7)
RESIDUAL VOLUME (RV) (EXTERNAL): 32
SP GR UR STRIP: >=1.03 (ref 1–1.03)
UROBILINOGEN UR STRIP-ACNC: 0.2 E.U./DL

## 2021-09-29 PROCEDURE — 81003 URINALYSIS AUTO W/O SCOPE: CPT | Mod: QW | Performed by: UROLOGY

## 2021-09-29 PROCEDURE — 51798 US URINE CAPACITY MEASURE: CPT | Performed by: UROLOGY

## 2021-09-29 PROCEDURE — 99213 OFFICE O/P EST LOW 20 MIN: CPT | Mod: 25 | Performed by: UROLOGY

## 2021-09-29 ASSESSMENT — PAIN SCALES - GENERAL: PAINLEVEL: NO PAIN (0)

## 2021-09-29 ASSESSMENT — MIFFLIN-ST. JEOR: SCORE: 1432.32

## 2021-09-29 NOTE — LETTER
Date:October 24, 2021      Patient was self referred, no letter generated. Do not send.        [facility] Health Information

## 2021-09-29 NOTE — LETTER
9/29/2021       RE: Isiah Fuentes  [address]  [facility] [address]     Dear Colleague,    Thank you for referring your patient, Isiah Fuentes, to the [facility] [place] at [facility]. Please see a copy of my visit note below.    Office Visit Note  [facility]  [phone]    UROLOGIC DIAGNOSES:   Elevated PSA  Enlarged prostate    CURRENT INTERVENTIONS:   Negative prostate biopsy 2020  MRI prostate 2019    HISTORY:   Isiah returns to clinic today for follow-up on his enlarged prostate.  His prostate biopsy in December was negative.  His PSA most recently has gone back up to 12.6.  He continues to have no urinary symptoms or complaints at this time.      PAST MEDICAL HISTORY:   Past Medical History:   Diagnosis Date     Mumps        PAST SURGICAL HISTORY: History reviewed. No pertinent surgical history.    FAMILY HISTORY: History reviewed. No pertinent family history.    SOCIAL HISTORY:   Social History     Socioeconomic History     Marital status:      Spouse name: None     Number of children: None     Years of education: None     Highest education level: None   Occupational History     None   Tobacco Use     Smoking status: Never Smoker     Smokeless tobacco: Never Used   Substance and Sexual Activity     Alcohol use: Yes     Drug use: Never     Sexual activity: Not Currently   Other Topics Concern     Parent/sibling w/ CABG, MI or angioplasty before 65F 55M? Not Asked   Social History Narrative     None     Social Determinants of Health     Financial Resource Strain:      Difficulty of Paying Living Expenses:    Food Insecurity:      Worried About Running Out of Food in the Last Year:      Ran Out of Food in the Last Year:    Transportation Needs:      Lack of Transportation (Medical):      Lack of Transportation (Non-Medical):    Physical Activity:      Days of Exercise per Week:      Minutes of Exercise per Session:   "  Stress:      Feeling of Stress :    Social Connections:      Frequency of Communication with Friends and Family:      Frequency of Social Gatherings with Friends and Family:      Attends Judaism Services:      Active Member of Clubs or Organizations:      Attends Club or Organization Meetings:      Marital Status:    Intimate Partner Violence:      Fear of Current or Ex-Partner:      Emotionally Abused:      Physically Abused:      Sexually Abused:        Review Of Systems:  Skin: No rash, pruritis, or skin pigmentation  Eyes: No changes in vision  Ears/Nose/Throat: No changes in hearing, no nosebleeds  Respiratory: No shortness of breath, dyspnea on exertion, cough, or hemoptysis  Cardiovascular: No chest pain or palpitations  Gastrointestinal: No diarrhea or constipation. No abdominal pain. No hematochezia  Genitourinary: see HPI  Musculoskeletal: No pain or swelling of joints, normal range of motion  Neurologic: No weakness or tremors  Psychiatric: No recent changes in memory or mood  Hematologic/Lymphatic/Immunologic: No easy bruising or enlarged lymph nodes  Endocrine: No weight gain or loss      PHYSICAL EXAM:    /60   Ht 1.715 m (5' 7.5\")   Wt 72.6 kg (160 lb)   BMI 24.69 kg/m      Constitutional: Well developed. Conversant and in no acute distress  Eyes: Anicteric sclera, conjunctiva clear, normal extraocular movements  ENT: Normocephalic and atraumatic,   Skin: Warm and dry. No rashes or lesions  Cardiac: No peripheral edema  Back/Flank: Not done  CNS/PNS: Normal musculature and movements, moves all extremities normally  Respiratory: Normal non-labored breathing  Abdomen: Soft nontender and nondistended  Peripheral Vascular: No peripheral edema  Mental Status/Psych: Alert and Oriented x 3. Normal mood and affect    Penis: Not done  Scrotal Skin: Not done  Testicles: Not done  Epididymis: Not done  Digital Rectal Exam: The prostate is moderately enlarged, benign and symmetric to " palpation    Cystoscopy: Not done    Imaging: None    Urinalysis: UA RESULTS:  Recent Labs   Lab Test 09/29/21  0858   COLOR Yellow   APPEARANCE Clear   URINEGLC Negative   URINEBILI Negative   URINEKETONE Negative   SG >=1.030   UBLD Negative   URINEPH 5.5   PROTEIN Negative   UROBILINOGEN 0.2   NITRITE Negative   LEUKEST Negative       PSA: 12.6    Post Void Residual:     Other labs: None today      IMPRESSION:  Elevated PSA    PLAN:  His PSA remains elevated.  It has fluctuated quite a bit over the past few years.  At this point I think we are just seeing his normal PSA fluctuations but I recommended that we follow PSA closely.  In 6 months we will check another PSA.      Jose Fulton M.D.                Again, thank you for allowing me to participate in the care of your patient.      Sincerely,    Jose Fulton MD

## 2021-09-29 NOTE — NURSING NOTE
Chief Complaint   Patient presents with     Elevated PSA     Enlarged Prostate     PVR:  32 mL    Lien Neri, EMT

## 2021-09-29 NOTE — PROGRESS NOTES
Office Visit Note  [facility]  [phone]    UROLOGIC DIAGNOSES:   Elevated PSA  Enlarged prostate    CURRENT INTERVENTIONS:   Negative prostate biopsy 2020  MRI prostate 2019    HISTORY:   Isiah returns to clinic today for follow-up on his enlarged prostate.  His prostate biopsy in December was negative.  His PSA most recently has gone back up to 12.6.  He continues to have no urinary symptoms or complaints at this time.      PAST MEDICAL HISTORY:   Past Medical History:   Diagnosis Date     Mumps        PAST SURGICAL HISTORY: History reviewed. No pertinent surgical history.    FAMILY HISTORY: History reviewed. No pertinent family history.    SOCIAL HISTORY:   Social History     Socioeconomic History     Marital status:      Spouse name: None     Number of children: None     Years of education: None     Highest education level: None   Occupational History     None   Tobacco Use     Smoking status: Never Smoker     Smokeless tobacco: Never Used   Substance and Sexual Activity     Alcohol use: Yes     Drug use: Never     Sexual activity: Not Currently   Other Topics Concern     Parent/sibling w/ CABG, MI or angioplasty before 65F 55M? Not Asked   Social History Narrative     None     Social Determinants of Health     Financial Resource Strain:      Difficulty of Paying Living Expenses:    Food Insecurity:      Worried About Running Out of Food in the Last Year:      Ran Out of Food in the Last Year:    Transportation Needs:      Lack of Transportation (Medical):      Lack of Transportation (Non-Medical):    Physical Activity:      Days of Exercise per Week:      Minutes of Exercise per Session:    Stress:      Feeling of Stress :    Social Connections:      Frequency of Communication with Friends and Family:      Frequency of Social Gatherings with Friends and Family:      Attends Buddhist Services:      Active Member of Clubs or Organizations:      Attends Club or Organization Meetings:       "Marital Status:    Intimate Partner Violence:      Fear of Current or Ex-Partner:      Emotionally Abused:      Physically Abused:      Sexually Abused:        Review Of Systems:  Skin: No rash, pruritis, or skin pigmentation  Eyes: No changes in vision  Ears/Nose/Throat: No changes in hearing, no nosebleeds  Respiratory: No shortness of breath, dyspnea on exertion, cough, or hemoptysis  Cardiovascular: No chest pain or palpitations  Gastrointestinal: No diarrhea or constipation. No abdominal pain. No hematochezia  Genitourinary: see HPI  Musculoskeletal: No pain or swelling of joints, normal range of motion  Neurologic: No weakness or tremors  Psychiatric: No recent changes in memory or mood  Hematologic/Lymphatic/Immunologic: No easy bruising or enlarged lymph nodes  Endocrine: No weight gain or loss      PHYSICAL EXAM:    /60   Ht 1.715 m (5' 7.5\")   Wt 72.6 kg (160 lb)   BMI 24.69 kg/m      Constitutional: Well developed. Conversant and in no acute distress  Eyes: Anicteric sclera, conjunctiva clear, normal extraocular movements  ENT: Normocephalic and atraumatic,   Skin: Warm and dry. No rashes or lesions  Cardiac: No peripheral edema  Back/Flank: Not done  CNS/PNS: Normal musculature and movements, moves all extremities normally  Respiratory: Normal non-labored breathing  Abdomen: Soft nontender and nondistended  Peripheral Vascular: No peripheral edema  Mental Status/Psych: Alert and Oriented x 3. Normal mood and affect    Penis: Not done  Scrotal Skin: Not done  Testicles: Not done  Epididymis: Not done  Digital Rectal Exam: The prostate is moderately enlarged, benign and symmetric to palpation    Cystoscopy: Not done    Imaging: None    Urinalysis: UA RESULTS:  Recent Labs   Lab Test 09/29/21  0858   COLOR Yellow   APPEARANCE Clear   URINEGLC Negative   URINEBILI Negative   URINEKETONE Negative   SG >=1.030   UBLD Negative   URINEPH 5.5   PROTEIN Negative   UROBILINOGEN 0.2   NITRITE Negative "   LEUKEST Negative       PSA: 12.6    Post Void Residual:     Other labs: None today      IMPRESSION:  Elevated PSA    PLAN:  His PSA remains elevated.  It has fluctuated quite a bit over the past few years.  At this point I think we are just seeing his normal PSA fluctuations but I recommended that we follow PSA closely.  In 6 months we will check another PSA.      Jose Fulton M.D.

## 2021-10-10 ENCOUNTER — HEALTH MAINTENANCE LETTER (OUTPATIENT)
Age: 76
End: 2021-10-10

## 2022-03-28 ENCOUNTER — LAB (OUTPATIENT)
Dept: LAB | Facility: CLINIC | Age: 77
End: 2022-03-28
Payer: MEDICARE

## 2022-03-28 DIAGNOSIS — R97.20 ELEVATED PROSTATE SPECIFIC ANTIGEN (PSA): ICD-10-CM

## 2022-03-28 PROCEDURE — 84153 ASSAY OF PSA TOTAL: CPT

## 2022-03-28 PROCEDURE — 36415 COLL VENOUS BLD VENIPUNCTURE: CPT

## 2022-03-29 LAB — PSA SERPL-MCNC: 7.73 UG/L (ref 0–4)

## 2022-05-21 ENCOUNTER — HEALTH MAINTENANCE LETTER (OUTPATIENT)
Age: 77
End: 2022-05-21

## 2022-09-18 ENCOUNTER — HEALTH MAINTENANCE LETTER (OUTPATIENT)
Age: 77
End: 2022-09-18

## 2023-06-04 ENCOUNTER — HEALTH MAINTENANCE LETTER (OUTPATIENT)
Age: 78
End: 2023-06-04

## 2023-12-04 ENCOUNTER — MEDICAL CORRESPONDENCE (OUTPATIENT)
Dept: HEALTH INFORMATION MANAGEMENT | Facility: CLINIC | Age: 78
End: 2023-12-04
Payer: MEDICARE

## 2023-12-04 ENCOUNTER — TRANSFERRED RECORDS (OUTPATIENT)
Dept: HEALTH INFORMATION MANAGEMENT | Facility: CLINIC | Age: 78
End: 2023-12-04
Payer: MEDICARE

## 2023-12-05 ENCOUNTER — TRANSCRIBE ORDERS (OUTPATIENT)
Dept: OTHER | Age: 78
End: 2023-12-05

## 2023-12-05 DIAGNOSIS — R33.8 ACUTE URINARY RETENTION: ICD-10-CM

## 2023-12-05 DIAGNOSIS — R39.198 OTHER DIFFICULTIES WITH MICTURITION: ICD-10-CM

## 2023-12-05 DIAGNOSIS — R39.198 DIFFICULTY IN VOIDING: Primary | ICD-10-CM

## 2023-12-05 DIAGNOSIS — R33.8 OTHER RETENTION OF URINE: ICD-10-CM

## 2023-12-06 ENCOUNTER — TELEPHONE (OUTPATIENT)
Dept: UROLOGY | Facility: CLINIC | Age: 78
End: 2023-12-06
Payer: MEDICARE

## 2023-12-06 NOTE — TELEPHONE ENCOUNTER
Isiah beauchamp had narayanan placed in ED for retention and noticed  this am urine is darker  in color with some small flexks in it. Urine is draining well . No fever chills  feeling ill  or signs of an  infection . Explained the  urine can fluctuate with narayanan if urine is dark yellow  he needs to drink  more water   same if he would be seeing blood in the urine . Insgabbycte dto call  back if seeing blood with clots that does not clear  and UTI symptoms as  listed above  or narayanan not draining  otherwise keep TOV  appointment for next week .Alva agrees  with plan

## 2023-12-13 ENCOUNTER — OFFICE VISIT (OUTPATIENT)
Dept: UROLOGY | Facility: CLINIC | Age: 78
End: 2023-12-13
Payer: MEDICARE

## 2023-12-13 VITALS — HEART RATE: 86 BPM | DIASTOLIC BLOOD PRESSURE: 78 MMHG | SYSTOLIC BLOOD PRESSURE: 118 MMHG | OXYGEN SATURATION: 97 %

## 2023-12-13 DIAGNOSIS — R33.8 OTHER RETENTION OF URINE: ICD-10-CM

## 2023-12-13 DIAGNOSIS — N40.1 BENIGN PROSTATIC HYPERPLASIA WITH NOCTURIA: Primary | ICD-10-CM

## 2023-12-13 DIAGNOSIS — R39.198 OTHER DIFFICULTIES WITH MICTURITION: ICD-10-CM

## 2023-12-13 DIAGNOSIS — R39.198 DIFFICULTY IN VOIDING: ICD-10-CM

## 2023-12-13 DIAGNOSIS — R33.8 ACUTE URINARY RETENTION: ICD-10-CM

## 2023-12-13 DIAGNOSIS — R35.1 BENIGN PROSTATIC HYPERPLASIA WITH NOCTURIA: Primary | ICD-10-CM

## 2023-12-13 PROCEDURE — 99214 OFFICE O/P EST MOD 30 MIN: CPT | Performed by: NURSE PRACTITIONER

## 2023-12-13 RX ORDER — TAMSULOSIN HYDROCHLORIDE 0.4 MG/1
0.4 CAPSULE ORAL DAILY
Qty: 90 CAPSULE | Refills: 4 | Status: SHIPPED | OUTPATIENT
Start: 2023-12-13 | End: 2024-08-19

## 2023-12-13 RX ORDER — TAMSULOSIN HYDROCHLORIDE 0.4 MG/1
CAPSULE ORAL
COMMUNITY
Start: 2023-12-06 | End: 2024-08-19

## 2023-12-13 NOTE — PROGRESS NOTES
Urology Outpatient Visit    Name: Isiah Fuentes    MRN: [id number]   YOB: 1945               Chief Complaint:   AUR         Impression and Plan:   Impression / Plan:   Isiah in a 78 y male w BPH who presented to ED on 12/4 w AUR, likely 2/2 BPH, here today for TOV.      -It was my pleasure to meet with Mr. Fuentes in clinic today to discuss his lower urinary tract symptoms. Patient presentation is not consistent with prostate cancer, UTI, urinary obstruction, prostatitis, neurogenic bladder, diabetes, nor diuretic related. I explained that his lower urinary tract symptoms are likely secondary to benign prostatic hyperplasia (BPH). We reviewed the natural history of BPH. We noted that progression of BPH and associated symptoms.   -Recommend Flomax 0.4 mg daily long term.  -Follow up PRN.     Thank you for the opportunity to participate in the care of Isiah Fuentes.     JERRY Ho, CNP  [facility] - Department of Urology  [phone]          History of Present Illness:   Isiah Fuentes is a 78 year old male with history of BPH w LUTS (nocturia) who presented to the ED on 12/4 with AUR, he was catheterized for 1L and discharged  with indwelling in place. He was initiated on Flomax at that time as well. Tolerating Flomax. Reports prior to AUR experiencing weak stream and nocturia a few times a night. Patient is a talented .     History is obtained from patient & EMR          Past Medical History:     Past Medical History:   Diagnosis Date    Mumps             Past Surgical History:   No past surgical history on file.         Social History:     Social History     Tobacco Use    Smoking status: Never    Smokeless tobacco: Never   Substance Use Topics    Alcohol use: Yes            Family History:   No family history on file.         Allergies:     Allergies   Allergen Reactions    Peanuts [Nuts]             Medications:     Current Outpatient Medications   Medication  "Sig    Ascorbic Acid (VITAMIN C) 500 MG CAPS     Cyanocobalamin (VITAMIN B 12 PO)     FOLIC ACID PO Take 1 mg by mouth daily    MULTIPLE VITAMIN PO Take 1 tablet by mouth daily    UNABLE TO FIND MEDICATION NAME: beta-sitosterol    VITAMIN B COMPLEX-C PO     VITAMIN D, CHOLECALCIFEROL, PO Take by mouth daily     No current facility-administered medications for this visit.             Review of Systems:    ROS: See HPI for pertinent details.  Remainder of 10-point ROS negative.         Physical Exam:   VS:  T: Data Unavailable    HR: Data Unavailable    BP: Data Unavailable    RR: Data Unavailable     GEN:  Alert.  NAD. Pt is not diaphoretic.   HEENT:  Sclerae anicteric.    CV:  No obvious jugular venous distension  LUNGS: No respiratory distress, breathing comfortably wo accessory muscle use.  ABD:  ND.    EXT:  Warm, well perfused.  SKIN:  Warm.  Dry.   NEURO:  CN grossly intact.           Data:   All laboratory data reviewed:    Lab Results   Component Value Date    PSA 7.73 03/28/2022    PSA 12.60 09/27/2021    PSA 8.19 09/09/2020    PSA 7.33 08/07/2020    PSA 13.40 07/20/2020    PSA 9.19 09/23/2019    PSA 8.95 07/25/2019     No results found for: \"CR\"  Lab Results   Component Value Date    GFRESTIMATED >90 08/01/2019     Color Urine (no units)   Date Value   09/29/2021 Yellow     Appearance Urine (no units)   Date Value   09/29/2021 Clear     Glucose Urine (mg/dL)   Date Value   09/29/2021 Negative     Bilirubin Urine (no units)   Date Value   09/29/2021 Negative     Ketones Urine (mg/dL)   Date Value   09/29/2021 Negative     Specific Gravity Urine (no units)   Date Value   09/29/2021 >=1.030     pH Urine (no units)   Date Value   09/29/2021 5.5     Protein Albumin Urine (mg/dL)   Date Value   09/29/2021 Negative     Urobilinogen Urine (E.U./dL)   Date Value   09/29/2021 0.2     Nitrite Urine (no units)   Date Value   09/29/2021 Negative     Leukocyte Esterase Urine (no units)   Date Value   09/29/2021 " Negative

## 2023-12-13 NOTE — LETTER
12/13/2023       RE: Isiah Fuentes  [address] [place] [address] [address]  [facility] [address]     Dear Colleague,    Thank you for referring your patient, Isiah Fuentes, to the [facility] [place] at [facility]. Please see a copy of my visit note below.      Urology Outpatient Visit    Name: Isiah Fuentes    MRN: [id number]   YOB: 1945               Chief Complaint:   AUR         Impression and Plan:   Impression / Plan:   Isiah in a 78 y male w BPH who presented to ED on 12/4 w AUR, likely 2/2 BPH, here today for TOV.      -It was my pleasure to meet with Mr. Fuentes in clinic today to discuss his lower urinary tract symptoms. Patient presentation is not consistent with prostate cancer, UTI, urinary obstruction, prostatitis, neurogenic bladder, diabetes, nor diuretic related. I explained that his lower urinary tract symptoms are likely secondary to benign prostatic hyperplasia (BPH). We reviewed the natural history of BPH. We noted that progression of BPH and associated symptoms.   -Recommend Flomax 0.4 mg daily long term.  -Follow up PRN.     Thank you for the opportunity to participate in the care of Isiah Fuentes.     David Rivas APRN, CNP  [facility] - Department of Urology  [phone]          History of Present Illness:   Isiah Fuentes is a 78 year old male with history of BPH w LUTS (nocturia) who presented to the ED on 12/4 with AUR, he was catheterized for 1L and discharged  with indwelling in place. He was initiated on Flomax at that time as well. Tolerating Flomax. Reports prior to AUR experiencing weak stream and nocturia a few times a night. Patient is a talented .     History is obtained from patient & EMR          Past Medical History:     Past Medical History:   Diagnosis Date    Mumps             Past Surgical History:   No past surgical history on file.         Social History:     Social  "History     Tobacco Use    Smoking status: Never    Smokeless tobacco: Never   Substance Use Topics    Alcohol use: Yes            Family History:   No family history on file.         Allergies:     Allergies   Allergen Reactions    Peanuts [Nuts]             Medications:     Current Outpatient Medications   Medication Sig    Ascorbic Acid (VITAMIN C) 500 MG CAPS     Cyanocobalamin (VITAMIN B 12 PO)     FOLIC ACID PO Take 1 mg by mouth daily    MULTIPLE VITAMIN PO Take 1 tablet by mouth daily    UNABLE TO FIND MEDICATION NAME: beta-sitosterol    VITAMIN B COMPLEX-C PO     VITAMIN D, CHOLECALCIFEROL, PO Take by mouth daily     No current facility-administered medications for this visit.             Review of Systems:    ROS: See HPI for pertinent details.  Remainder of 10-point ROS negative.         Physical Exam:   VS:  T: Data Unavailable    HR: Data Unavailable    BP: Data Unavailable    RR: Data Unavailable     GEN:  Alert.  NAD. Pt is not diaphoretic.   HEENT:  Sclerae anicteric.    CV:  No obvious jugular venous distension  LUNGS: No respiratory distress, breathing comfortably wo accessory muscle use.  ABD:  ND.    EXT:  Warm, well perfused.  SKIN:  Warm.  Dry.   NEURO:  CN grossly intact.           Data:   All laboratory data reviewed:    Lab Results   Component Value Date    PSA 7.73 03/28/2022    PSA 12.60 09/27/2021    PSA 8.19 09/09/2020    PSA 7.33 08/07/2020    PSA 13.40 07/20/2020    PSA 9.19 09/23/2019    PSA 8.95 07/25/2019     No results found for: \"CR\"  Lab Results   Component Value Date    GFRESTIMATED >90 08/01/2019     Color Urine (no units)   Date Value   09/29/2021 Yellow     Appearance Urine (no units)   Date Value   09/29/2021 Clear     Glucose Urine (mg/dL)   Date Value   09/29/2021 Negative     Bilirubin Urine (no units)   Date Value   09/29/2021 Negative     Ketones Urine (mg/dL)   Date Value   09/29/2021 Negative     Specific Gravity Urine (no units)   Date Value   09/29/2021 >=1.030 "     pH Urine (no units)   Date Value   09/29/2021 5.5     Protein Albumin Urine (mg/dL)   Date Value   09/29/2021 Negative     Urobilinogen Urine (E.U./dL)   Date Value   09/29/2021 0.2     Nitrite Urine (no units)   Date Value   09/29/2021 Negative     Leukocyte Esterase Urine (no units)   Date Value   09/29/2021 Negative

## 2023-12-13 NOTE — PATIENT INSTRUCTIONS
Patient Return Precautions:  1. Monitor urinary symptoms: Keep track of any changes in urinary frequency, urgency, or difficulty in initiating or stopping urine flow. Contact your healthcare provider if you experience any recurring or noticeably worsening symptoms.    2. Follow-up appointments: Attend all scheduled follow-up appointments to ensure proper monitoring of your urinary health. These visits will allow your provider to assess your condition, address any concerns, and make adjustments to your treatment plan if necessary.    3. Maintain good hydration: Drink an adequate amount of water throughout the day to promote urinary health and prevent urinary tract infections. However, avoid excessive fluid intake right before bedtime to minimize nocturia (frequent urination at night).    4. Avoid known triggers: Be mindful of factors that can aggravate or trigger urinary retention. These may include certain medications, alcohol, caffeine, and bladder irritants such as spicy foods and carbonated drinks. Discuss with your healthcare provider if you have any concerns about specific triggers in your case.    5. Follow prescribed medication regimen: If you have been prescribed any medications to manage or prevent urinary retention, take them as directed by your healthcare provider. Do not skip doses or discontinue medication without consulting them first.    6. Practice regular toileting habits: Aim to establish a consistent routine for urination, even if you no longer experience urinary retention. This can help maintain healthy bladder function. It is generally recommended to urinate every 3-4 hours during waking hours, or as advised by your healthcare provider.    7. Prevent constipation: Maintain regular bowel movements by eating a balanced diet that includes plenty of dietary fiber, staying physically active, and staying adequately hydrated. Avoid straining during bowel movements, as this can put pressure on the  urinary system.    8. Take precautions during travel: If you have any upcoming travel plans, ensure that you have access to restroom facilities along the way. Plan accordingly to avoid situations that may result in prolonged periods without access to a restroom.    9. Communicate with your healthcare provider: If you experience any concerning or new symptoms related to your urinary health or have doubts about any aspect of your treatment plan, promptly reach out to your healthcare provider for guidance and support. They are there to help you manage your condition effectively.    -Remember, this information provides general guidance, and it is important to consult with your healthcare provider for personalized advice based on your specific medical history and circumstances.

## 2024-07-14 ENCOUNTER — HEALTH MAINTENANCE LETTER (OUTPATIENT)
Age: 79
End: 2024-07-14

## 2025-07-19 ENCOUNTER — HEALTH MAINTENANCE LETTER (OUTPATIENT)
Age: 80
End: 2025-07-19